# Patient Record
Sex: FEMALE | NOT HISPANIC OR LATINO
[De-identification: names, ages, dates, MRNs, and addresses within clinical notes are randomized per-mention and may not be internally consistent; named-entity substitution may affect disease eponyms.]

---

## 2018-09-17 PROBLEM — Z00.00 ENCOUNTER FOR PREVENTIVE HEALTH EXAMINATION: Status: ACTIVE | Noted: 2018-09-17

## 2018-09-25 ENCOUNTER — TRANSCRIPTION ENCOUNTER (OUTPATIENT)
Age: 38
End: 2018-09-25

## 2018-10-17 ENCOUNTER — APPOINTMENT (OUTPATIENT)
Dept: RHEUMATOLOGY | Facility: CLINIC | Age: 38
End: 2018-10-17
Payer: COMMERCIAL

## 2018-10-17 VITALS
OXYGEN SATURATION: 98 % | SYSTOLIC BLOOD PRESSURE: 119 MMHG | HEIGHT: 65 IN | WEIGHT: 125 LBS | DIASTOLIC BLOOD PRESSURE: 79 MMHG | HEART RATE: 78 BPM | BODY MASS INDEX: 20.83 KG/M2

## 2018-10-17 DIAGNOSIS — Z78.9 OTHER SPECIFIED HEALTH STATUS: ICD-10-CM

## 2018-10-17 DIAGNOSIS — Z82.61 FAMILY HISTORY OF ARTHRITIS: ICD-10-CM

## 2018-10-17 PROCEDURE — 99204 OFFICE O/P NEW MOD 45 MIN: CPT

## 2018-10-19 ENCOUNTER — LABORATORY RESULT (OUTPATIENT)
Age: 38
End: 2018-10-19

## 2018-10-19 LAB
25(OH)D3 SERPL-MCNC: 19.9 NG/ML
ALBUMIN SERPL ELPH-MCNC: 4.5 G/DL
ALP BLD-CCNC: 59 U/L
ALT SERPL-CCNC: 17 U/L
ANION GAP SERPL CALC-SCNC: 13 MMOL/L
APPEARANCE: CLEAR
AST SERPL-CCNC: 19 U/L
BASOPHILS # BLD AUTO: 0.01 K/UL
BASOPHILS NFR BLD AUTO: 0.2 %
BILIRUB SERPL-MCNC: 0.6 MG/DL
BILIRUBIN URINE: NEGATIVE
BLOOD URINE: NEGATIVE
BUN SERPL-MCNC: 15 MG/DL
CALCIUM SERPL-MCNC: 9.4 MG/DL
CHLORIDE SERPL-SCNC: 104 MMOL/L
CK SERPL-CCNC: 67 U/L
CO2 SERPL-SCNC: 23 MMOL/L
COLOR: YELLOW
CREAT SERPL-MCNC: 0.72 MG/DL
CREAT SPEC-SCNC: 79 MG/DL
CREAT/PROT UR: 0.1 RATIO
CRP SERPL-MCNC: <0.1 MG/DL
EOSINOPHIL # BLD AUTO: 0.11 K/UL
EOSINOPHIL NFR BLD AUTO: 2.1 %
ERYTHROCYTE [SEDIMENTATION RATE] IN BLOOD BY WESTERGREN METHOD: 10 MM/HR
GLUCOSE QUALITATIVE U: NEGATIVE MG/DL
GLUCOSE SERPL-MCNC: 85 MG/DL
HAV IGM SER QL: NONREACTIVE
HBV CORE IGM SER QL: NONREACTIVE
HBV SURFACE AG SER QL: NONREACTIVE
HCT VFR BLD CALC: 40.8 %
HCV AB SER QL: NONREACTIVE
HCV S/CO RATIO: 0.1 S/CO
HGB BLD-MCNC: 13.7 G/DL
IMM GRANULOCYTES NFR BLD AUTO: 0.2 %
KETONES URINE: ABNORMAL
LEUKOCYTE ESTERASE URINE: NEGATIVE
LYMPHOCYTES # BLD AUTO: 2.07 K/UL
LYMPHOCYTES NFR BLD AUTO: 40.4 %
MAN DIFF?: NORMAL
MCHC RBC-ENTMCNC: 31.2 PG
MCHC RBC-ENTMCNC: 33.6 GM/DL
MCV RBC AUTO: 92.9 FL
MONOCYTES # BLD AUTO: 0.31 K/UL
MONOCYTES NFR BLD AUTO: 6.1 %
NEUTROPHILS # BLD AUTO: 2.61 K/UL
NEUTROPHILS NFR BLD AUTO: 51 %
NITRITE URINE: NEGATIVE
PH URINE: 5.5
PLATELET # BLD AUTO: 233 K/UL
POTASSIUM SERPL-SCNC: 4 MMOL/L
PROT SERPL-MCNC: 7.3 G/DL
PROT UR-MCNC: 5 MG/DL
PROTEIN URINE: NEGATIVE MG/DL
RBC # BLD: 4.39 M/UL
RBC # FLD: 12.4 %
RHEUMATOID FACT SER QL: <10 IU/ML
SODIUM SERPL-SCNC: 140 MMOL/L
SPECIFIC GRAVITY URINE: 1.02
THYROGLOB AB SERPL-ACNC: <20 IU/ML
THYROPEROXIDASE AB SERPL IA-ACNC: 18.4 IU/ML
TSH SERPL-ACNC: 1.77 UIU/ML
UROBILINOGEN URINE: NEGATIVE MG/DL
WBC # FLD AUTO: 5.12 K/UL

## 2018-10-21 LAB
CCP AB SER IA-ACNC: <8 UNITS
CENTROMERE IGG SER-ACNC: <0.2 AL
DSDNA AB SER-ACNC: <12 IU/ML
ENA RNP AB SER IA-ACNC: <0.2 AL
ENA SCL70 IGG SER IA-ACNC: <0.2 AL
ENA SM AB SER IA-ACNC: <0.2 AL
ENA SS-A AB SER IA-ACNC: 4.6 AL
ENA SS-B AB SER IA-ACNC: <0.2 AL
RF+CCP IGG SER-IMP: NEGATIVE

## 2018-10-22 LAB
ANA SER IF-ACNC: NEGATIVE
G6PD SER-CCNC: 13.5 U/G HGB
MITOCHONDRIA AB SER IF-ACNC: ABNORMAL
SMOOTH MUSCLE AB SER QL IF: NORMAL

## 2018-10-23 LAB
B2 GLYCOPROT1 IGG SER-ACNC: <5 SGU
CARDIOLIPIN IGM SER-MCNC: 7.3 MPL
CARDIOLIPIN IGM SER-MCNC: <5 GPL

## 2018-10-24 LAB — B2 GLYCOPROT1 IGM SER-ACNC: 104.2 SMU

## 2018-10-26 ENCOUNTER — RX RENEWAL (OUTPATIENT)
Age: 38
End: 2018-10-26

## 2019-01-22 ENCOUNTER — LABORATORY RESULT (OUTPATIENT)
Age: 39
End: 2019-01-22

## 2019-01-28 LAB
B2 GLYCOPROT1 IGG SER-ACNC: <5 SGU
B2 GLYCOPROT1 IGM SER-ACNC: 55.4 SMU
CARDIOLIPIN IGM SER-MCNC: 5.8 GPL
CARDIOLIPIN IGM SER-MCNC: 6.1 MPL

## 2019-04-17 ENCOUNTER — APPOINTMENT (OUTPATIENT)
Dept: RHEUMATOLOGY | Facility: CLINIC | Age: 39
End: 2019-04-17
Payer: COMMERCIAL

## 2019-04-17 VITALS
HEART RATE: 83 BPM | OXYGEN SATURATION: 98 % | BODY MASS INDEX: 19.99 KG/M2 | HEIGHT: 65 IN | DIASTOLIC BLOOD PRESSURE: 75 MMHG | WEIGHT: 120 LBS | SYSTOLIC BLOOD PRESSURE: 116 MMHG

## 2019-04-17 PROCEDURE — 99214 OFFICE O/P EST MOD 30 MIN: CPT

## 2019-04-17 RX ORDER — OSELTAMIVIR PHOSPHATE 75 MG/1
75 CAPSULE ORAL
Qty: 10 | Refills: 0 | Status: DISCONTINUED | COMMUNITY
Start: 2018-09-23 | End: 2019-04-17

## 2019-04-17 NOTE — HISTORY OF PRESENT ILLNESS
[FreeTextEntry1] : Patient returns for a followup after 6 months. I had initially seen her in October as a new patient. She has a diagnosis of Sjogren syndrome. Her labs reveal a positive Sjögren's antibodies, which confirmed dry eyes and dry mouth. She was also noted to have positive beta-2 glycoprotein antibody on 2 occasions now. She was noted to have a positive mitochondrial antibody. She saw GI and was told that she would be watched clinically.\par \par She has never been pregnant, she denies miscarriages and or thromboembolism. There is a significant family history of autoimmunity. Her mother has rheumatoid arthritis, her aunt has lupus. Her aunt has had several miscarriages. She's not sure whether she wants to start a family yet.\par \par She admits to fatigue and joint pains, she rates her symptoms around a 5/10. She admits to dry eyes and dry mouth which does bother her.\par \par She denies alopecia Raynaud's phenomenon. She denies fevers or chills or night sweats. She is currently not using birth control.

## 2019-04-17 NOTE — ASSESSMENT
[FreeTextEntry1] : 38 year old  female with no significant past medical history has a previous diagnosis of Sjogren syndrome. The diagnosis was made several years ago by a rheumatologist based on her symptoms and abnormal labs.\par \par She has a significant family history of autoimmunity with several family members with lupus and rheumatoid arthritis. Her current review of systems is positive for alopecia, Raynaud's phenomenon, dry eyes, dysphagia, fatigue, muscle and joint pain.\par \par She has had an abnormal eye exam in the past. Today, and examination she has full range of motion of her joints without any evidence of inflammatory arthritis. She does not have any tender points on exam.\par \par Based on her history I agree with the diagnosis of Sjogren syndrome. \par \par Sjogren syndrome-\par -she is to start Plaquenil 200 mg b.i.d. given dryness in the eyes and mouth and joint pains.\par -Given the presence of the Sjögren's antibodies when she gets pregnant the fetus will have to be monitored for heart block\par -I made her aware of this\par \par Antiphospholipid antibody-\par -she has had positive beta-2 glycoprotein antibodies on 2 occasions now\par -There is a family history of miscarriages\par -She is not contemplating any future pregnancies.\par -If and when she does plan to get pregnant she will be placed on aspirin\par \par Polyarthralgia-\par -she has arthralgias and fatigue, trial of Plaquenil\par -She is aware to get her eyes examined\par \par Followup 3 months. She is aware to call if her symptoms worsen.

## 2019-04-17 NOTE — REVIEW OF SYSTEMS
[Feeling Tired] : feeling tired [Dry Eyes] : dryness of the eyes [Joint Pain] : joint pain [Arthralgias] : arthralgias [Fever] : no fever [Chills] : no chills [Feeling Poorly] : not feeling poorly [Eye Pain] : no eye pain [Loss Of Hearing] : no hearing loss [Chest Pain] : no chest pain [Palpitations] : no palpitations [Leg Claudication] : no intermittent leg claudication [Lower Ext Edema] : no lower extremity edema [Shortness Of Breath] : no shortness of breath [Cough] : no cough [SOB on Exertion] : no shortness of breath during exertion [Abdominal Pain] : no abdominal pain [Dysuria] : no dysuria [Joint Swelling] : no joint swelling [Joint Stiffness] : no joint stiffness [Skin Lesions] : no skin lesions [Itching] : no itching [Dizziness] : no dizziness [Fainting] : no fainting [Limb Weakness] : no limb weakness [Difficulty Walking] : no difficulty walking [Anxiety] : no anxiety [Depression] : no depression [Muscle Weakness] : no muscle weakness [Easy Bruising] : no tendency for easy bruising

## 2019-04-17 NOTE — PHYSICAL EXAM
[General Appearance - Well Developed] : well developed [General Appearance - Alert] : alert [General Appearance - Well Nourished] : well nourished [Neck Appearance] : the appearance of the neck was normal [Sclera] : the sclera and conjunctiva were normal [Oropharynx] : the oropharynx was normal [Heart Sounds] : normal S1 and S2 [Auscultation Breath Sounds / Voice Sounds] : lungs were clear to auscultation bilaterally [Respiration, Rhythm And Depth] : normal respiratory rhythm and effort [Edema] : there was no peripheral edema [Murmurs] : no murmurs [Full Pulse] : the pedal pulses are present [Cervical Lymph Nodes Enlarged Anterior Bilaterally] : anterior cervical [Supraclavicular Lymph Nodes Enlarged Bilaterally] : supraclavicular [Abdomen Tenderness] : non-tender [No Spinal Tenderness] : no spinal tenderness [Abnormal Walk] : normal gait [Nail Clubbing] : no clubbing  or cyanosis of the fingernails [Musculoskeletal - Swelling] : no joint swelling seen [Motor Tone] : muscle strength and tone were normal [] : no rash [Sensation] : the sensory exam was normal to light touch and pinprick [Deep Tendon Reflexes (DTR)] : deep tendon reflexes were 2+ and symmetric [Oriented To Time, Place, And Person] : oriented to person, place, and time [Impaired Insight] : insight and judgment were intact [Motor Exam] : the motor exam was normal [Affect] : the affect was normal [FreeTextEntry1] : FROM neck, shoulders, elbows, wrists, hands, hips, knees, ankles and feet, including the small joints of the hands and feet without any evidence of inflammatory arthritis no tender points noted

## 2019-04-29 ENCOUNTER — EMERGENCY (EMERGENCY)
Facility: HOSPITAL | Age: 39
LOS: 1 days | Discharge: HOME | End: 2019-04-29
Attending: EMERGENCY MEDICINE | Admitting: EMERGENCY MEDICINE
Payer: COMMERCIAL

## 2019-04-29 VITALS
DIASTOLIC BLOOD PRESSURE: 72 MMHG | HEART RATE: 91 BPM | WEIGHT: 119.93 LBS | TEMPERATURE: 101 F | HEIGHT: 65 IN | SYSTOLIC BLOOD PRESSURE: 122 MMHG | RESPIRATION RATE: 16 BRPM | OXYGEN SATURATION: 99 %

## 2019-04-29 VITALS
TEMPERATURE: 98 F | OXYGEN SATURATION: 99 % | HEART RATE: 70 BPM | DIASTOLIC BLOOD PRESSURE: 63 MMHG | SYSTOLIC BLOOD PRESSURE: 102 MMHG | RESPIRATION RATE: 16 BRPM

## 2019-04-29 LAB
ALBUMIN SERPL ELPH-MCNC: 3.7 G/DL — SIGNIFICANT CHANGE UP (ref 3.3–5)
ALP SERPL-CCNC: 59 U/L — SIGNIFICANT CHANGE UP (ref 30–120)
ALT FLD-CCNC: 32 U/L DA — SIGNIFICANT CHANGE UP (ref 10–60)
ANION GAP SERPL CALC-SCNC: 11 MMOL/L — SIGNIFICANT CHANGE UP (ref 5–17)
AST SERPL-CCNC: 21 U/L — SIGNIFICANT CHANGE UP (ref 10–40)
BILIRUB SERPL-MCNC: 0.5 MG/DL — SIGNIFICANT CHANGE UP (ref 0.2–1.2)
BUN SERPL-MCNC: 9 MG/DL — SIGNIFICANT CHANGE UP (ref 7–23)
CALCIUM SERPL-MCNC: 8.5 MG/DL — SIGNIFICANT CHANGE UP (ref 8.4–10.5)
CHLORIDE SERPL-SCNC: 102 MMOL/L — SIGNIFICANT CHANGE UP (ref 96–108)
CO2 SERPL-SCNC: 25 MMOL/L — SIGNIFICANT CHANGE UP (ref 22–31)
CREAT SERPL-MCNC: 0.76 MG/DL — SIGNIFICANT CHANGE UP (ref 0.5–1.3)
FLU A RESULT: SIGNIFICANT CHANGE UP
FLU A RESULT: SIGNIFICANT CHANGE UP
FLUAV AG NPH QL: SIGNIFICANT CHANGE UP
FLUBV AG NPH QL: SIGNIFICANT CHANGE UP
GLUCOSE SERPL-MCNC: 120 MG/DL — HIGH (ref 70–99)
HCT VFR BLD CALC: 38.5 % — SIGNIFICANT CHANGE UP (ref 34.5–45)
HGB BLD-MCNC: 13.4 G/DL — SIGNIFICANT CHANGE UP (ref 11.5–15.5)
MCHC RBC-ENTMCNC: 31.5 PG — SIGNIFICANT CHANGE UP (ref 27–34)
MCHC RBC-ENTMCNC: 34.8 GM/DL — SIGNIFICANT CHANGE UP (ref 32–36)
MCV RBC AUTO: 90.4 FL — SIGNIFICANT CHANGE UP (ref 80–100)
NRBC # BLD: 0 /100 WBCS — SIGNIFICANT CHANGE UP (ref 0–0)
PLATELET # BLD AUTO: 172 K/UL — SIGNIFICANT CHANGE UP (ref 150–400)
POTASSIUM SERPL-MCNC: 3.4 MMOL/L — LOW (ref 3.5–5.3)
POTASSIUM SERPL-SCNC: 3.4 MMOL/L — LOW (ref 3.5–5.3)
PROT SERPL-MCNC: 7 G/DL — SIGNIFICANT CHANGE UP (ref 6–8.3)
RBC # BLD: 4.26 M/UL — SIGNIFICANT CHANGE UP (ref 3.8–5.2)
RBC # FLD: 11.8 % — SIGNIFICANT CHANGE UP (ref 10.3–14.5)
RSV RESULT: SIGNIFICANT CHANGE UP
RSV RNA RESP QL NAA+PROBE: SIGNIFICANT CHANGE UP
SODIUM SERPL-SCNC: 138 MMOL/L — SIGNIFICANT CHANGE UP (ref 135–145)
WBC # BLD: 8.33 K/UL — SIGNIFICANT CHANGE UP (ref 3.8–10.5)
WBC # FLD AUTO: 8.33 K/UL — SIGNIFICANT CHANGE UP (ref 3.8–10.5)

## 2019-04-29 PROCEDURE — 85027 COMPLETE CBC AUTOMATED: CPT

## 2019-04-29 PROCEDURE — 96360 HYDRATION IV INFUSION INIT: CPT

## 2019-04-29 PROCEDURE — 99284 EMERGENCY DEPT VISIT MOD MDM: CPT

## 2019-04-29 PROCEDURE — 99284 EMERGENCY DEPT VISIT MOD MDM: CPT | Mod: 25

## 2019-04-29 PROCEDURE — 36415 COLL VENOUS BLD VENIPUNCTURE: CPT

## 2019-04-29 PROCEDURE — 80053 COMPREHEN METABOLIC PANEL: CPT

## 2019-04-29 PROCEDURE — 87631 RESP VIRUS 3-5 TARGETS: CPT

## 2019-04-29 RX ORDER — SODIUM CHLORIDE 9 MG/ML
2000 INJECTION INTRAMUSCULAR; INTRAVENOUS; SUBCUTANEOUS ONCE
Qty: 0 | Refills: 0 | Status: DISCONTINUED | OUTPATIENT
Start: 2019-04-29 | End: 2019-04-29

## 2019-04-29 RX ORDER — DIPHENHYDRAMINE HCL 50 MG
25 CAPSULE ORAL EVERY 4 HOURS
Qty: 0 | Refills: 0 | Status: DISCONTINUED | OUTPATIENT
Start: 2019-04-29 | End: 2019-05-03

## 2019-04-29 RX ORDER — IBUPROFEN 200 MG
600 TABLET ORAL ONCE
Qty: 0 | Refills: 0 | Status: COMPLETED | OUTPATIENT
Start: 2019-04-29 | End: 2019-04-29

## 2019-04-29 RX ORDER — HYDROXYCHLOROQUINE SULFATE 200 MG
0 TABLET ORAL
Qty: 0 | Refills: 0 | COMMUNITY

## 2019-04-29 RX ORDER — POLYMYXIN B SULF/TRIMETHOPRIM 10000-1/ML
1 DROPS OPHTHALMIC (EYE) ONCE
Qty: 0 | Refills: 0 | Status: COMPLETED | OUTPATIENT
Start: 2019-04-29 | End: 2019-04-29

## 2019-04-29 RX ORDER — SODIUM CHLORIDE 9 MG/ML
1000 INJECTION INTRAMUSCULAR; INTRAVENOUS; SUBCUTANEOUS ONCE
Qty: 0 | Refills: 0 | Status: COMPLETED | OUTPATIENT
Start: 2019-04-29 | End: 2019-04-29

## 2019-04-29 RX ADMIN — Medication 600 MILLIGRAM(S): at 12:46

## 2019-04-29 RX ADMIN — Medication 600 MILLIGRAM(S): at 13:47

## 2019-04-29 RX ADMIN — Medication 1 DROP(S): at 13:47

## 2019-04-29 RX ADMIN — SODIUM CHLORIDE 1000 MILLILITER(S): 9 INJECTION INTRAMUSCULAR; INTRAVENOUS; SUBCUTANEOUS at 14:00

## 2019-04-29 RX ADMIN — SODIUM CHLORIDE 1000 MILLILITER(S): 9 INJECTION INTRAMUSCULAR; INTRAVENOUS; SUBCUTANEOUS at 13:00

## 2019-04-29 RX ADMIN — Medication 25 MILLIGRAM(S): at 14:45

## 2019-04-29 NOTE — ED PROVIDER NOTE - CLINICAL SUMMARY MEDICAL DECISION MAKING FREE TEXT BOX
Pt seen and examined.  IV fluids given.  Motrin given for fever.    Labs ordered.  Flu ordered.  REsults discussed with pt and .  Continue Motrin for fever control.  saty hydrated.

## 2019-04-29 NOTE — ED ADULT NURSE NOTE - NSIMPLEMENTINTERV_GEN_ALL_ED
Implemented All Universal Safety Interventions:  [place] to call system. Call bell, personal items and telephone within reach. Instruct patient to call for assistance. Room bathroom lighting operational. Non-slip footwear when patient is off stretcher. Physically safe environment: no spills, clutter or unnecessary equipment. Stretcher in lowest position, wheels locked, appropriate side rails in place.

## 2019-04-29 NOTE — ED PROVIDER NOTE - PROGRESS NOTE DETAILS
Brain JARAMILLO for ED attending, Dr. Curtis : 37 y/o female with fever, body aches, dry cough, and near syncope today. Denies HA, CP, SOB, vomiting.   PE: temp 101.5, NAD, HEENT nl, lungs clear, abd soft, nontender. Red hive like rash on legs, pruritic in nature.  Impression: viral syndrome, r/o influenza, r/o allergic rx. Plan - labs, IV fluids, flu swab.

## 2019-04-29 NOTE — ED ADULT NURSE NOTE - OBJECTIVE STATEMENT
Pt came in for fever and generalized body aches started last night. Pt woke up this morning with redness and swelling of her both eyes. Pt also stated that she passed out ( witnessed by her SO) this morning while in the bathroom. No head injury noted. No vomiting No diarrhea. No chest pain

## 2019-04-29 NOTE — ED PROVIDER NOTE - OBJECTIVE STATEMENT
Pt is 37y/o female who presents to ED c/o fever since yesterday, highest 103, chills, rash which started this am.

## 2019-05-22 ENCOUNTER — LABORATORY RESULT (OUTPATIENT)
Age: 39
End: 2019-05-22

## 2019-05-28 LAB
ALBUMIN SERPL ELPH-MCNC: 4.2 G/DL
ALP BLD-CCNC: 59 U/L
ALT SERPL-CCNC: 23 U/L
ANA SER IF-ACNC: NEGATIVE
ANION GAP SERPL CALC-SCNC: 11 MMOL/L
AST SERPL-CCNC: 24 U/L
B2 GLYCOPROT1 IGG SER-ACNC: <5 SGU
B2 GLYCOPROT1 IGM SER-ACNC: >150 SMU
BASOPHILS # BLD AUTO: 0.02 K/UL
BASOPHILS NFR BLD AUTO: 0.5 %
BILIRUB SERPL-MCNC: 0.5 MG/DL
BUN SERPL-MCNC: 14 MG/DL
CALCIUM SERPL-MCNC: 9.3 MG/DL
CARDIOLIPIN IGM SER-MCNC: 5.3 GPL
CARDIOLIPIN IGM SER-MCNC: <5 MPL
CCP AB SER IA-ACNC: <8 UNITS
CHLORIDE SERPL-SCNC: 107 MMOL/L
CO2 SERPL-SCNC: 23 MMOL/L
CREAT SERPL-MCNC: 0.67 MG/DL
CREAT SPEC-SCNC: 83 MG/DL
CREAT/PROT UR: 0.1 RATIO
CRP SERPL-MCNC: <0.1 MG/DL
DSDNA AB SER-ACNC: <12 IU/ML
ENA RNP AB SER IA-ACNC: <0.2 AL
ENA SM AB SER IA-ACNC: <0.2 AL
ENA SS-A AB SER IA-ACNC: 4 AL
ENA SS-B AB SER IA-ACNC: <0.2 AL
EOSINOPHIL # BLD AUTO: 0.2 K/UL
EOSINOPHIL NFR BLD AUTO: 4.6 %
ERYTHROCYTE [SEDIMENTATION RATE] IN BLOOD BY WESTERGREN METHOD: 15 MM/HR
GLUCOSE SERPL-MCNC: 85 MG/DL
HCT VFR BLD CALC: 39.4 %
HGB BLD-MCNC: 13.2 G/DL
IMM GRANULOCYTES NFR BLD AUTO: 0.2 %
LYMPHOCYTES # BLD AUTO: 2.02 K/UL
LYMPHOCYTES NFR BLD AUTO: 46.7 %
MAN DIFF?: NORMAL
MCHC RBC-ENTMCNC: 30.6 PG
MCHC RBC-ENTMCNC: 33.5 GM/DL
MCV RBC AUTO: 91.2 FL
MITOCHONDRIA AB SER IF-ACNC: NORMAL
MONOCYTES # BLD AUTO: 0.26 K/UL
MONOCYTES NFR BLD AUTO: 6 %
NEUTROPHILS # BLD AUTO: 1.82 K/UL
NEUTROPHILS NFR BLD AUTO: 42 %
PLATELET # BLD AUTO: 253 K/UL
POTASSIUM SERPL-SCNC: 4.6 MMOL/L
PROT SERPL-MCNC: 7.2 G/DL
PROT UR-MCNC: 7 MG/DL
RBC # BLD: 4.32 M/UL
RBC # FLD: 12 %
RF+CCP IGG SER-IMP: NEGATIVE
RHEUMATOID FACT SER QL: <10 IU/ML
SMOOTH MUSCLE AB SER QL IF: NORMAL
SODIUM SERPL-SCNC: 141 MMOL/L
WBC # FLD AUTO: 4.33 K/UL

## 2019-08-19 ENCOUNTER — APPOINTMENT (OUTPATIENT)
Dept: RHEUMATOLOGY | Facility: CLINIC | Age: 39
End: 2019-08-19
Payer: COMMERCIAL

## 2019-08-19 VITALS
DIASTOLIC BLOOD PRESSURE: 67 MMHG | HEART RATE: 70 BPM | SYSTOLIC BLOOD PRESSURE: 106 MMHG | BODY MASS INDEX: 19.99 KG/M2 | OXYGEN SATURATION: 98 % | HEIGHT: 65 IN | WEIGHT: 120 LBS

## 2019-08-19 PROBLEM — M35.00 SJOGREN SYNDROME, UNSPECIFIED: Chronic | Status: ACTIVE | Noted: 2019-04-29

## 2019-08-19 PROCEDURE — 99214 OFFICE O/P EST MOD 30 MIN: CPT

## 2019-08-19 NOTE — ASSESSMENT
[FreeTextEntry1] : 39 year old  female with no significant past medical history has a previous diagnosis of Sjogren syndrome. The diagnosis was made several years ago by a rheumatologist based on her symptoms and abnormal labs.\par \par She has a significant family history of autoimmunity with several family members with lupus and rheumatoid arthritis. Her current review of systems is positive for alopecia, Raynaud's phenomenon, dry eyes, dysphagia, fatigue, muscle and joint pain.\par \par She has had an abnormal eye exam in the past. Today, and examination she has full range of motion of her joints without any evidence of inflammatory arthritis. She does not have any tender points on exam.\par \par Based on her history I agree with the diagnosis of Sjogren syndrome. \par \par Sjogren syndrome-\par -she is on Plaquenil 200 mg b.i.d. given dryness in the eyes and mouth and joint pains.\par -Given the presence of the Sjögren's antibodies when she gets pregnant the fetus will have to be monitored for heart block\par -I made her aware of this\par - await full response to PLQ\par \par Antiphospholipid antibody-\par -she has had positive beta-2 glycoprotein antibodies on 2 occasions now\par -There is a family history of miscarriages\par -She is not contemplating any future pregnancies.\par -If and when she does plan to get pregnant she will be placed on aspirin\par \par Polyarthralgia-\par -her back bothers her , h/o trauma in the past\par - to see physiatry for further evaluation\par - she may have an underlying herniated disc\par \par Followup 3 months. She is aware to call if her symptoms worsen.

## 2019-08-19 NOTE — REVIEW OF SYSTEMS
[Fever] : no fever [Chills] : no chills [Feeling Poorly] : not feeling poorly [Feeling Tired] : feeling tired [Eye Pain] : no eye pain [Dry Eyes] : dryness of the eyes [Loss Of Hearing] : no hearing loss [Chest Pain] : no chest pain [Palpitations] : no palpitations [Leg Claudication] : no intermittent leg claudication [Shortness Of Breath] : no shortness of breath [Lower Ext Edema] : no lower extremity edema [Cough] : no cough [SOB on Exertion] : no shortness of breath during exertion [Dysuria] : no dysuria [Abdominal Pain] : no abdominal pain [Arthralgias] : arthralgias [Joint Pain] : joint pain [Joint Swelling] : no joint swelling [Joint Stiffness] : no joint stiffness [Skin Lesions] : no skin lesions [Itching] : no itching [Dizziness] : no dizziness [Fainting] : no fainting [Limb Weakness] : no limb weakness [Difficulty Walking] : no difficulty walking [Anxiety] : no anxiety [Depression] : no depression [Muscle Weakness] : no muscle weakness [Easy Bruising] : no tendency for easy bruising

## 2019-08-19 NOTE — PHYSICAL EXAM
[General Appearance - Alert] : alert [General Appearance - Well Nourished] : well nourished [General Appearance - Well Developed] : well developed [Sclera] : the sclera and conjunctiva were normal [Oropharynx] : the oropharynx was normal [Neck Appearance] : the appearance of the neck was normal [Respiration, Rhythm And Depth] : normal respiratory rhythm and effort [Auscultation Breath Sounds / Voice Sounds] : lungs were clear to auscultation bilaterally [Heart Sounds] : normal S1 and S2 [Murmurs] : no murmurs [Full Pulse] : the pedal pulses are present [Edema] : there was no peripheral edema [Abdomen Tenderness] : non-tender [Cervical Lymph Nodes Enlarged Anterior Bilaterally] : anterior cervical [Supraclavicular Lymph Nodes Enlarged Bilaterally] : supraclavicular [No Spinal Tenderness] : no spinal tenderness [Abnormal Walk] : normal gait [Nail Clubbing] : no clubbing  or cyanosis of the fingernails [Musculoskeletal - Swelling] : no joint swelling seen [Motor Tone] : muscle strength and tone were normal [FreeTextEntry1] : FROM neck, shoulders, elbows, wrists, hands, hips, knees, ankles and feet, including the small joints of the hands and feet without any evidence of inflammatory arthritis no tender points noted [] : no rash [Deep Tendon Reflexes (DTR)] : deep tendon reflexes were 2+ and symmetric [Sensation] : the sensory exam was normal to light touch and pinprick [Motor Exam] : the motor exam was normal [Oriented To Time, Place, And Person] : oriented to person, place, and time [Impaired Insight] : insight and judgment were intact [Affect] : the affect was normal

## 2019-10-15 LAB
25(OH)D3 SERPL-MCNC: 65.4 NG/ML
ALBUMIN SERPL ELPH-MCNC: 4.5 G/DL
ALP BLD-CCNC: 47 U/L
ALT SERPL-CCNC: 16 U/L
ANA SER IF-ACNC: NEGATIVE
ANION GAP SERPL CALC-SCNC: 15 MMOL/L
AST SERPL-CCNC: 18 U/L
BASOPHILS # BLD AUTO: 0.02 K/UL
BASOPHILS NFR BLD AUTO: 0.5 %
BILIRUB SERPL-MCNC: 0.5 MG/DL
BUN SERPL-MCNC: 14 MG/DL
C3 SERPL-MCNC: 72 MG/DL
C4 SERPL-MCNC: 14 MG/DL
CALCIUM SERPL-MCNC: 9.1 MG/DL
CHLORIDE SERPL-SCNC: 105 MMOL/L
CO2 SERPL-SCNC: 21 MMOL/L
CREAT SERPL-MCNC: 0.71 MG/DL
CREAT SPEC-SCNC: 86 MG/DL
CREAT/PROT UR: 0.1 RATIO
CRP SERPL-MCNC: <0.1 MG/DL
ENA SS-A AB SER IA-ACNC: 3.3 AL
ENA SS-B AB SER IA-ACNC: <0.2 AL
EOSINOPHIL # BLD AUTO: 0.1 K/UL
EOSINOPHIL NFR BLD AUTO: 2.6 %
ERYTHROCYTE [SEDIMENTATION RATE] IN BLOOD BY WESTERGREN METHOD: 7 MM/HR
GLUCOSE SERPL-MCNC: 89 MG/DL
HCT VFR BLD CALC: 41 %
HGB BLD-MCNC: 13.5 G/DL
IMM GRANULOCYTES NFR BLD AUTO: 0.3 %
LYMPHOCYTES # BLD AUTO: 1.83 K/UL
LYMPHOCYTES NFR BLD AUTO: 47.8 %
MAN DIFF?: NORMAL
MCHC RBC-ENTMCNC: 31 PG
MCHC RBC-ENTMCNC: 32.9 GM/DL
MCV RBC AUTO: 94 FL
MONOCYTES # BLD AUTO: 0.32 K/UL
MONOCYTES NFR BLD AUTO: 8.4 %
NEUTROPHILS # BLD AUTO: 1.55 K/UL
NEUTROPHILS NFR BLD AUTO: 40.4 %
PLATELET # BLD AUTO: 219 K/UL
POTASSIUM SERPL-SCNC: 4.4 MMOL/L
PROT SERPL-MCNC: 6.9 G/DL
PROT UR-MCNC: 7 MG/DL
RBC # BLD: 4.36 M/UL
RBC # FLD: 11.9 %
SODIUM SERPL-SCNC: 141 MMOL/L
WBC # FLD AUTO: 3.83 K/UL

## 2019-11-25 ENCOUNTER — APPOINTMENT (OUTPATIENT)
Dept: RHEUMATOLOGY | Facility: CLINIC | Age: 39
End: 2019-11-25
Payer: COMMERCIAL

## 2019-11-25 VITALS
SYSTOLIC BLOOD PRESSURE: 108 MMHG | OXYGEN SATURATION: 99 % | TEMPERATURE: 98.6 F | DIASTOLIC BLOOD PRESSURE: 70 MMHG | HEART RATE: 71 BPM | WEIGHT: 120 LBS | BODY MASS INDEX: 19.97 KG/M2

## 2019-11-25 PROCEDURE — 99214 OFFICE O/P EST MOD 30 MIN: CPT | Mod: 25

## 2019-11-25 PROCEDURE — 36415 COLL VENOUS BLD VENIPUNCTURE: CPT

## 2019-11-25 NOTE — REVIEW OF SYSTEMS
[Fever] : no fever [Chills] : no chills [Feeling Poorly] : not feeling poorly [Feeling Tired] : feeling tired [Eye Pain] : no eye pain [Dry Eyes] : dryness of the eyes [Loss Of Hearing] : no hearing loss [Chest Pain] : no chest pain [Palpitations] : no palpitations [Leg Claudication] : no intermittent leg claudication [Lower Ext Edema] : no lower extremity edema [Shortness Of Breath] : no shortness of breath [Cough] : no cough [SOB on Exertion] : no shortness of breath during exertion [Abdominal Pain] : no abdominal pain [Dysuria] : no dysuria [Arthralgias] : arthralgias [Joint Pain] : joint pain [Joint Swelling] : no joint swelling [Joint Stiffness] : no joint stiffness [Skin Lesions] : no skin lesions [Itching] : no itching [Dizziness] : no dizziness [Fainting] : no fainting [Limb Weakness] : no limb weakness [Difficulty Walking] : no difficulty walking [Anxiety] : no anxiety [Depression] : no depression [Muscle Weakness] : no muscle weakness [Easy Bruising] : no tendency for easy bruising

## 2019-11-25 NOTE — PHYSICAL EXAM
[General Appearance - Alert] : alert [General Appearance - Well Nourished] : well nourished [General Appearance - Well Developed] : well developed [Sclera] : the sclera and conjunctiva were normal [Oropharynx] : the oropharynx was normal [Neck Appearance] : the appearance of the neck was normal [Respiration, Rhythm And Depth] : normal respiratory rhythm and effort [Auscultation Breath Sounds / Voice Sounds] : lungs were clear to auscultation bilaterally [Heart Sounds] : normal S1 and S2 [Murmurs] : no murmurs [Full Pulse] : the pedal pulses are present [Edema] : there was no peripheral edema [Abdomen Tenderness] : non-tender [Cervical Lymph Nodes Enlarged Anterior Bilaterally] : anterior cervical [Supraclavicular Lymph Nodes Enlarged Bilaterally] : supraclavicular [No Spinal Tenderness] : no spinal tenderness [Abnormal Walk] : normal gait [Nail Clubbing] : no clubbing  or cyanosis of the fingernails [Motor Tone] : muscle strength and tone were normal [Musculoskeletal - Swelling] : no joint swelling seen [FreeTextEntry1] : FROM neck, shoulders, elbows, wrists, hands, hips, knees, ankles and feet, including the small joints of the hands and feet without any evidence of inflammatory arthritis no tender points noted [] : no rash [Deep Tendon Reflexes (DTR)] : deep tendon reflexes were 2+ and symmetric [Sensation] : the sensory exam was normal to light touch and pinprick [Motor Exam] : the motor exam was normal [Oriented To Time, Place, And Person] : oriented to person, place, and time [Affect] : the affect was normal [Impaired Insight] : insight and judgment were intact

## 2019-11-25 NOTE — HISTORY OF PRESENT ILLNESS
[FreeTextEntry1] : Rina is here for a followup after August. She is now on Plaquenil 200 mg b.i.d. She has been doing well. She denies any rashes or fevers.\par She has less fatigue and overall thinks the PLQ is definitely helping. \par Her eye exam was normal.\par \par She admits to hair thinning but denies patchy alopecia. She admits to dry eyes and dry mouth, she rates her symptoms as a 3/10. She admits to fatigue around a 5/10. She has intermittent aches and pains around 3/10.\par \par She denies oral ulcers.  She denies chest pain or shortness of breath. She has aches and pains.\par She tries to exercise regularly. She denies any other changes in her health\par She has a good appetite and denies weight loss. She denies fevers or chills or night sweats.

## 2019-11-25 NOTE — ASSESSMENT
[FreeTextEntry1] : 39 year old  female with no significant past medical history has a previous diagnosis of Sjogren syndrome. The diagnosis was made several years ago by a rheumatologist based on her symptoms and abnormal labs.\par \par She has a significant family history of autoimmunity with several family members with lupus and rheumatoid arthritis. Her current review of systems is positive for alopecia, Raynaud's phenomenon, dry eyes, dysphagia, fatigue, muscle and joint pain.\par \par She has had an abnormal eye exam in the past. Today, and examination she has full range of motion of her joints without any evidence of inflammatory arthritis. She does not have any tender points on exam.\par \par Based on her history I agree with the diagnosis of Sjogren syndrome. \par \par Sjogren syndrome-\par -she is on Plaquenil 200 mg b.i.d. given dryness in the eyes and mouth and joint pains.\par - she is definitely better with PLQ\par -Given the presence of the Sjögren's antibodies when she gets pregnant the fetus will have to be monitored for heart block\par -I made her aware of this\par - stressed importance of eye exam\par - on last set of labs C3 was low, this is the first time I ordered it will repeat along with w/u for paraproteinemia\par \par Antiphospholipid antibody-\par -she has had positive beta-2 glycoprotein antibodies on 2 occasions now\par -There is a family history of miscarriages\par -She is not contemplating any future pregnancies.\par -If and when she does plan to get pregnant she will be placed on aspirin\par \par Polyarthralgia-\par -overall improved\par \par Followup 4 months. She is aware to call if her symptoms worsen.

## 2019-11-26 LAB
ALBUMIN SERPL ELPH-MCNC: 4.4 G/DL
ALP BLD-CCNC: 51 U/L
ALT SERPL-CCNC: 22 U/L
ANION GAP SERPL CALC-SCNC: 13 MMOL/L
AST SERPL-CCNC: 20 U/L
BASOPHILS # BLD AUTO: 0.02 K/UL
BASOPHILS NFR BLD AUTO: 0.4 %
BILIRUB SERPL-MCNC: 0.4 MG/DL
BUN SERPL-MCNC: 8 MG/DL
C3 SERPL-MCNC: 78 MG/DL
C4 SERPL-MCNC: 14 MG/DL
CALCIUM SERPL-MCNC: 9.8 MG/DL
CHLORIDE SERPL-SCNC: 106 MMOL/L
CO2 SERPL-SCNC: 22 MMOL/L
CREAT SERPL-MCNC: 0.72 MG/DL
CRP SERPL-MCNC: <0.1 MG/DL
EOSINOPHIL # BLD AUTO: 0.1 K/UL
EOSINOPHIL NFR BLD AUTO: 2.2 %
ERYTHROCYTE [SEDIMENTATION RATE] IN BLOOD BY WESTERGREN METHOD: 4 MM/HR
GLUCOSE SERPL-MCNC: 90 MG/DL
HCT VFR BLD CALC: 40.3 %
HGB BLD-MCNC: 13 G/DL
IMM GRANULOCYTES NFR BLD AUTO: 0 %
LYMPHOCYTES # BLD AUTO: 2.46 K/UL
LYMPHOCYTES NFR BLD AUTO: 53.1 %
MAN DIFF?: NORMAL
MCHC RBC-ENTMCNC: 30.7 PG
MCHC RBC-ENTMCNC: 32.3 GM/DL
MCV RBC AUTO: 95.3 FL
MONOCYTES # BLD AUTO: 0.35 K/UL
MONOCYTES NFR BLD AUTO: 7.6 %
NEUTROPHILS # BLD AUTO: 1.7 K/UL
NEUTROPHILS NFR BLD AUTO: 36.7 %
PLATELET # BLD AUTO: 223 K/UL
POTASSIUM SERPL-SCNC: 4.2 MMOL/L
PROT SERPL-MCNC: 7 G/DL
RBC # BLD: 4.23 M/UL
RBC # FLD: 12.4 %
RHEUMATOID FACT SER QL: <10 IU/ML
SODIUM SERPL-SCNC: 141 MMOL/L
WBC # FLD AUTO: 4.63 K/UL

## 2019-12-01 LAB
ALBUMIN MFR SERPL ELPH: 61.5 %
ALBUMIN SERPL-MCNC: 4.3 G/DL
ALBUMIN/GLOB SERPL: 1.6 RATIO
ALPHA1 GLOB MFR SERPL ELPH: 2.6 %
ALPHA1 GLOB SERPL ELPH-MCNC: 0.2 G/DL
ALPHA2 GLOB MFR SERPL ELPH: 7.6 %
ALPHA2 GLOB SERPL ELPH-MCNC: 0.5 G/DL
ANA SER IF-ACNC: NEGATIVE
B-GLOBULIN MFR SERPL ELPH: 9.6 %
B-GLOBULIN SERPL ELPH-MCNC: 0.7 G/DL
CCP AB SER IA-ACNC: <8 UNITS
DEPRECATED KAPPA LC FREE/LAMBDA SER: 0.97 RATIO
ENA SS-A AB SER IA-ACNC: 3.9 AL
ENA SS-B AB SER IA-ACNC: <0.2 AL
GAMMA GLOB FLD ELPH-MCNC: 1.3 G/DL
GAMMA GLOB MFR SERPL ELPH: 18.7 %
IGA SER QL IEP: 228 MG/DL
IGG SER QL IEP: 1291 MG/DL
IGM SER QL IEP: 182 MG/DL
INTERPRETATION SERPL IEP-IMP: NORMAL
KAPPA LC CSF-MCNC: 1.5 MG/DL
KAPPA LC SERPL-MCNC: 1.45 MG/DL
M PROTEIN MFR SERPL ELPH: 2.2 %
M PROTEIN SPEC IFE-MCNC: NORMAL
MONOCLON BAND OBS SERPL: 0.2 G/DL
PROT SERPL-MCNC: 7 G/DL
PROT SERPL-MCNC: 7 G/DL
RF+CCP IGG SER-IMP: NEGATIVE

## 2020-01-27 ENCOUNTER — OUTPATIENT (OUTPATIENT)
Dept: OUTPATIENT SERVICES | Facility: HOSPITAL | Age: 40
LOS: 1 days | Discharge: HOME | End: 2020-01-27

## 2020-01-31 ENCOUNTER — APPOINTMENT (OUTPATIENT)
Dept: HEMATOLOGY ONCOLOGY | Facility: CLINIC | Age: 40
End: 2020-01-31
Payer: COMMERCIAL

## 2020-01-31 VITALS
WEIGHT: 123.38 LBS | DIASTOLIC BLOOD PRESSURE: 74 MMHG | RESPIRATION RATE: 16 BRPM | HEART RATE: 70 BPM | TEMPERATURE: 98.5 F | BODY MASS INDEX: 20.55 KG/M2 | HEIGHT: 65 IN | SYSTOLIC BLOOD PRESSURE: 124 MMHG

## 2020-01-31 PROCEDURE — 99203 OFFICE O/P NEW LOW 30 MIN: CPT

## 2020-01-31 NOTE — REVIEW OF SYSTEMS
[Patient Intake Form Reviewed] : Patient intake form was reviewed [Fatigue] : fatigue [Joint Pain] : joint pain [Joint Stiffness] : joint stiffness [Negative] : Heme/Lymph [FreeTextEntry4] : dry mouth

## 2020-01-31 NOTE — REASON FOR VISIT
[Initial Consultation] : an initial consultation for [FreeTextEntry2] : IgG lambda MGUS , antiphospholipid antibodies

## 2020-01-31 NOTE — CONSULT LETTER
[Dear  ___] : Dear  [unfilled], [( Thank you for referring [unfilled] for consultation for _____ )] : Thank you for referring [unfilled] for consultation for [unfilled] [Please see my note below.] : Please see my note below. [Consult Closing:] : Thank you very much for allowing me to participate in the care of this patient.  If you have any questions, please do not hesitate to contact me. [Sincerely,] : Sincerely, [FreeTextEntry2] : Dr Laura Hood  [FreeTextEntry3] : Yulia Samuels

## 2020-01-31 NOTE — HISTORY OF PRESENT ILLNESS
[de-identified] : 40 y/o woman with Sjogren syndrome diagnosed few years ago, currently on Plaquenil- stable. Blood work done by her rheumatologist Dr Hood in November 2019 showed low level monoclonal IgG lambda. No anemia, renal disease, hypercalcemia or bone problems.\par Also she has  hx of elevated antiphospholipid antibodies -beta2 glycoprotein  IgM detected on several occasions with negative rest of APS panel and negative Lupus anticoagulant. \par No hx of thromboembolism. Not on OCP.  No  miscarriages. \par \par Fells well.

## 2020-01-31 NOTE — ASSESSMENT
[FreeTextEntry1] : 38 y/o woman with very low level of monoclonal IgG lambda incidentally detected on blood work. No clinical symptoms to suggest underlying plasma cell disorder.\par Clinical presentation c/w low risk MGUS-  low M spike, normal immunoglobulin levels, normal kappa to lambda ratio and no anemia, renal disease.\par Discussed natural history and small risk of progression to plasma cell disorder .Need for periodic monitoring - every 6 months for now with blood work.\par \par Discussed positivity for APS antibodies- meets laboratory criteria for APS ( significant titer documented on several occasions) but no clinical symptoms. Will need VTE prophylaxis in high risk setting ( pregnancy,  surgeries etc) .\par \par \par Monitor.\par Blood work in 6 months ( June )- CBC. BMP and immunoelectrophoresis.\par \par RV in June after labs. \par \par d/w patient and she is comfortable with the plan

## 2020-01-31 NOTE — PHYSICAL EXAM
[Normal] : clear to auscultation bilaterally, no dullness, no wheezing [de-identified] : no splenomegaly

## 2020-02-03 DIAGNOSIS — D47.2 MONOCLONAL GAMMOPATHY: ICD-10-CM

## 2020-02-03 DIAGNOSIS — D68.61 ANTIPHOSPHOLIPID SYNDROME: ICD-10-CM

## 2020-02-26 ENCOUNTER — RX RENEWAL (OUTPATIENT)
Age: 40
End: 2020-02-26

## 2020-06-01 ENCOUNTER — RX RENEWAL (OUTPATIENT)
Age: 40
End: 2020-06-01

## 2020-06-03 ENCOUNTER — APPOINTMENT (OUTPATIENT)
Dept: RHEUMATOLOGY | Facility: CLINIC | Age: 40
End: 2020-06-03
Payer: COMMERCIAL

## 2020-06-03 ENCOUNTER — LABORATORY RESULT (OUTPATIENT)
Age: 40
End: 2020-06-03

## 2020-06-03 VITALS
DIASTOLIC BLOOD PRESSURE: 70 MMHG | SYSTOLIC BLOOD PRESSURE: 110 MMHG | HEART RATE: 67 BPM | WEIGHT: 125 LBS | TEMPERATURE: 98.8 F | HEIGHT: 65 IN | BODY MASS INDEX: 20.83 KG/M2 | OXYGEN SATURATION: 98 %

## 2020-06-03 PROCEDURE — 99214 OFFICE O/P EST MOD 30 MIN: CPT | Mod: 25

## 2020-06-03 PROCEDURE — 36415 COLL VENOUS BLD VENIPUNCTURE: CPT

## 2020-06-03 NOTE — HISTORY OF PRESENT ILLNESS
[FreeTextEntry1] : Rnia is here for a followup after November. She is on Plaquenil 200 mg b.i.d. She has been doing well. She denies any rashes or fevers.\par She has less fatigue and overall thinks the PLQ is definitely helping. \par Her eye exam was normal.\par \par She admits to hair thinning but denies patchy alopecia. She admits to dry eyes and dry mouth, she rates her symptoms as a 3/10. She admits to fatigue around a 5/10. She has intermittent aches and pains around 3/10.\par She saw hematology and was noted to have antiphospholipid antibodies, Is not contemplating any pregnancies in the near future\par She also wants to know if she can see GI, she has intermittent abdominal pain with weird symptoms. She has had a colonoscopy endoscopy as a cook. She was told she may have IBS, she is gluten free at this time.\par \par She denies oral ulcers.  She denies chest pain or shortness of breath. She has aches and pains.\par She tries to exercise regularly. She denies any other changes in her health\par She has a good appetite and denies weight loss. She denies fevers or chills or night sweats.

## 2020-06-03 NOTE — REVIEW OF SYSTEMS
[Chills] : no chills [Fever] : no fever [Feeling Poorly] : not feeling poorly [Eye Pain] : no eye pain [Feeling Tired] : feeling tired [Loss Of Hearing] : no hearing loss [Dry Eyes] : dryness of the eyes [Chest Pain] : no chest pain [Palpitations] : no palpitations [Leg Claudication] : no intermittent leg claudication [Lower Ext Edema] : no lower extremity edema [Shortness Of Breath] : no shortness of breath [SOB on Exertion] : no shortness of breath during exertion [Cough] : no cough [Abdominal Pain] : no abdominal pain [Dysuria] : no dysuria [Arthralgias] : arthralgias [Joint Pain] : joint pain [Joint Swelling] : no joint swelling [Joint Stiffness] : no joint stiffness [Itching] : no itching [Dizziness] : no dizziness [Skin Lesions] : no skin lesions [Fainting] : no fainting [Limb Weakness] : no limb weakness [Difficulty Walking] : no difficulty walking [Anxiety] : no anxiety [Easy Bruising] : no tendency for easy bruising [Depression] : no depression [Muscle Weakness] : no muscle weakness

## 2020-06-03 NOTE — ASSESSMENT
[FreeTextEntry1] : 40 year old  female with no significant past medical history has a previous diagnosis of Sjogren syndrome. The diagnosis was made several years ago by a rheumatologist based on her symptoms and abnormal labs.\par \par She has a significant family history of autoimmunity with several family members with lupus and rheumatoid arthritis. Her current review of systems is positive for alopecia, Raynaud's phenomenon, dry eyes, dysphagia, fatigue, muscle and joint pain.\par \par She has had an abnormal eye exam in the past. Today, and examination she has full range of motion of her joints without any evidence of inflammatory arthritis. She does not have any tender points on exam.\par \par Based on her history I agree with the diagnosis of Sjogren syndrome. \par \par Sjogren syndrome-\par -she is on Plaquenil 200 mg b.i.d. given dryness in the eyes and mouth and joint pains.\par - she is definitely better with PLQ\par -Given the presence of the Sjögren's antibodies when she gets pregnant the fetus will have to be monitored for heart block\par -I made her aware of this\par - stressed importance of eye exam\par \par Antiphospholipid antibody-\par -she has had positive beta-2 glycoprotein antibodies on 2 occasions now\par -There is a family history of miscarriages\par -She is not contemplating any future pregnancies.\par -If and when she does plan to get pregnant she will be placed on aspirin\par \par Polyarthralgia-\par -overall improved\par \par \par Celiac disease\par -presumed diagnoses\par -Will see GI\par \par Followup 7 months. She is aware to call if her symptoms worsen.

## 2020-06-03 NOTE — PHYSICAL EXAM
[General Appearance - Alert] : alert [General Appearance - Well Nourished] : well nourished [General Appearance - Well Developed] : well developed [Sclera] : the sclera and conjunctiva were normal [Oropharynx] : the oropharynx was normal [Respiration, Rhythm And Depth] : normal respiratory rhythm and effort [Neck Appearance] : the appearance of the neck was normal [Murmurs] : no murmurs [Auscultation Breath Sounds / Voice Sounds] : lungs were clear to auscultation bilaterally [Heart Sounds] : normal S1 and S2 [Abdomen Tenderness] : non-tender [Full Pulse] : the pedal pulses are present [Edema] : there was no peripheral edema [Supraclavicular Lymph Nodes Enlarged Bilaterally] : supraclavicular [Cervical Lymph Nodes Enlarged Anterior Bilaterally] : anterior cervical [Abnormal Walk] : normal gait [No Spinal Tenderness] : no spinal tenderness [Musculoskeletal - Swelling] : no joint swelling seen [Nail Clubbing] : no clubbing  or cyanosis of the fingernails [Motor Tone] : muscle strength and tone were normal [FreeTextEntry1] : FROM neck, shoulders, elbows, wrists, hands, hips, knees, ankles and feet, including the small joints of the hands and feet without any evidence of inflammatory arthritis no tender points noted [Sensation] : the sensory exam was normal to light touch and pinprick [Deep Tendon Reflexes (DTR)] : deep tendon reflexes were 2+ and symmetric [] : no rash [Motor Exam] : the motor exam was normal [Oriented To Time, Place, And Person] : oriented to person, place, and time [Impaired Insight] : insight and judgment were intact [Affect] : the affect was normal

## 2020-06-04 LAB
25(OH)D3 SERPL-MCNC: 65.1 NG/ML
ALBUMIN SERPL ELPH-MCNC: 4.5 G/DL
ALP BLD-CCNC: 53 U/L
ALT SERPL-CCNC: 18 U/L
ANA SER IF-ACNC: NEGATIVE
ANION GAP SERPL CALC-SCNC: 11 MMOL/L
APPEARANCE: CLEAR
AST SERPL-CCNC: 20 U/L
BASOPHILS # BLD AUTO: 0.02 K/UL
BASOPHILS NFR BLD AUTO: 0.4 %
BILIRUB SERPL-MCNC: 0.3 MG/DL
BILIRUBIN URINE: NEGATIVE
BLOOD URINE: NEGATIVE
BUN SERPL-MCNC: 10 MG/DL
C3 SERPL-MCNC: 76 MG/DL
C4 SERPL-MCNC: 15 MG/DL
CALCIUM SERPL-MCNC: 9.2 MG/DL
CHLORIDE SERPL-SCNC: 107 MMOL/L
CO2 SERPL-SCNC: 23 MMOL/L
COLOR: COLORLESS
CREAT SERPL-MCNC: 0.77 MG/DL
CREAT SPEC-SCNC: 25 MG/DL
CREAT/PROT UR: 0.3 RATIO
CRP SERPL-MCNC: <0.1 MG/DL
ENA RNP AB SER IA-ACNC: <0.2 AL
ENA SM AB SER IA-ACNC: <0.2 AL
ENA SS-A AB SER IA-ACNC: 2.7 AL
ENA SS-B AB SER IA-ACNC: <0.2 AL
EOSINOPHIL # BLD AUTO: 0.08 K/UL
EOSINOPHIL NFR BLD AUTO: 1.8 %
ERYTHROCYTE [SEDIMENTATION RATE] IN BLOOD BY WESTERGREN METHOD: 3 MM/HR
GLUCOSE QUALITATIVE U: NEGATIVE
GLUCOSE SERPL-MCNC: 92 MG/DL
HCT VFR BLD CALC: 40.2 %
HGB BLD-MCNC: 13.1 G/DL
IMM GRANULOCYTES NFR BLD AUTO: 0.2 %
KETONES URINE: NEGATIVE
LEUKOCYTE ESTERASE URINE: NEGATIVE
LUPUS ANTICOAGULANT CASCADE REFLEX: NORMAL
LYMPHOCYTES # BLD AUTO: 2.15 K/UL
LYMPHOCYTES NFR BLD AUTO: 48.1 %
MAN DIFF?: NORMAL
MCHC RBC-ENTMCNC: 30.8 PG
MCHC RBC-ENTMCNC: 32.6 GM/DL
MCV RBC AUTO: 94.4 FL
MONOCYTES # BLD AUTO: 0.33 K/UL
MONOCYTES NFR BLD AUTO: 7.4 %
NEUTROPHILS # BLD AUTO: 1.88 K/UL
NEUTROPHILS NFR BLD AUTO: 42.1 %
NITRITE URINE: NEGATIVE
PH URINE: 5.5
PLATELET # BLD AUTO: 222 K/UL
POTASSIUM SERPL-SCNC: 4.3 MMOL/L
PROT SERPL-MCNC: 6.9 G/DL
PROT UR-MCNC: 8 MG/DL
PROTEIN URINE: NEGATIVE
RBC # BLD: 4.26 M/UL
RBC # FLD: 11.9 %
RHEUMATOID FACT SER QL: <10 IU/ML
SODIUM SERPL-SCNC: 141 MMOL/L
SPECIFIC GRAVITY URINE: 1
UROBILINOGEN URINE: NORMAL
WBC # FLD AUTO: 4.47 K/UL

## 2020-06-05 LAB
CARDIOLIPIN IGM SER-MCNC: 7.4 GPL
CARDIOLIPIN IGM SER-MCNC: 9.8 MPL
DSDNA AB SER-ACNC: <12 IU/ML

## 2020-06-08 LAB
B2 GLYCOPROT1 IGG SER-ACNC: <5 SGU
B2 GLYCOPROT1 IGM SER-ACNC: 113.5 SMU

## 2020-06-12 LAB
CCP AB SER IA-ACNC: <8 UNITS
RF+CCP IGG SER-IMP: NEGATIVE

## 2020-06-23 ENCOUNTER — OUTPATIENT (OUTPATIENT)
Dept: OUTPATIENT SERVICES | Facility: HOSPITAL | Age: 40
LOS: 1 days | Discharge: HOME | End: 2020-06-23
Payer: COMMERCIAL

## 2020-06-23 DIAGNOSIS — D68.61 ANTIPHOSPHOLIPID SYNDROME: ICD-10-CM

## 2020-06-23 DIAGNOSIS — D47.2 MONOCLONAL GAMMOPATHY: ICD-10-CM

## 2020-06-26 LAB
APPEARANCE: CLEAR
BILIRUBIN URINE: NEGATIVE
BLOOD URINE: NEGATIVE
COLOR: NORMAL
CREAT SPEC-SCNC: 18 MG/DL
CREAT/PROT UR: 0.3 RATIO
GLUCOSE QUALITATIVE U: NEGATIVE
KETONES URINE: NEGATIVE
LEUKOCYTE ESTERASE URINE: NEGATIVE
NITRITE URINE: NEGATIVE
PH URINE: 6
PROT UR-MCNC: 5 MG/DL
PROTEIN URINE: NEGATIVE
SPECIFIC GRAVITY URINE: 1
UROBILINOGEN URINE: NORMAL

## 2020-07-07 ENCOUNTER — LABORATORY RESULT (OUTPATIENT)
Age: 40
End: 2020-07-07

## 2020-07-09 LAB
BASOPHILS # BLD AUTO: 0.03 K/UL
BASOPHILS NFR BLD AUTO: 0.6 %
EOSINOPHIL # BLD AUTO: 0.1 K/UL
EOSINOPHIL NFR BLD AUTO: 2.1 %
HCT VFR BLD CALC: 40.6 %
HGB BLD-MCNC: 13.3 G/DL
IMM GRANULOCYTES NFR BLD AUTO: 0.2 %
LYMPHOCYTES # BLD AUTO: 2.15 K/UL
LYMPHOCYTES NFR BLD AUTO: 45.6 %
MAN DIFF?: NORMAL
MCHC RBC-ENTMCNC: 30.2 PG
MCHC RBC-ENTMCNC: 32.8 GM/DL
MCV RBC AUTO: 92.3 FL
MONOCYTES # BLD AUTO: 0.34 K/UL
MONOCYTES NFR BLD AUTO: 7.2 %
NEUTROPHILS # BLD AUTO: 2.08 K/UL
NEUTROPHILS NFR BLD AUTO: 44.3 %
PLATELET # BLD AUTO: 235 K/UL
RBC # BLD: 4.4 M/UL
RBC # FLD: 11.9 %
WBC # FLD AUTO: 4.71 K/UL

## 2020-07-10 LAB
ALBUMIN SERPL ELPH-MCNC: 4.3 G/DL
ALP BLD-CCNC: 50 U/L
ALT SERPL-CCNC: 17 U/L
ANA SER IF-ACNC: NEGATIVE
ANION GAP SERPL CALC-SCNC: 13 MMOL/L
APPEARANCE: CLEAR
AST SERPL-CCNC: 16 U/L
BASOPHILS # BLD AUTO: 0.02 K/UL
BASOPHILS NFR BLD AUTO: 0.4 %
BILIRUB SERPL-MCNC: 0.6 MG/DL
BILIRUBIN URINE: NEGATIVE
BLOOD URINE: NEGATIVE
BUN SERPL-MCNC: 15 MG/DL
C3 SERPL-MCNC: 81 MG/DL
C4 SERPL-MCNC: 13 MG/DL
CALCIUM SERPL-MCNC: 8.8 MG/DL
CCP AB SER IA-ACNC: <8 UNITS
CHLORIDE SERPL-SCNC: 108 MMOL/L
CO2 SERPL-SCNC: 21 MMOL/L
COLOR: NORMAL
CREAT SERPL-MCNC: 0.7 MG/DL
CREAT SPEC-SCNC: 73 MG/DL
CREAT/PROT UR: 0.1 RATIO
CRP SERPL-MCNC: <0.1 MG/DL
DSDNA AB SER-ACNC: <12 IU/ML
ENA RNP AB SER IA-ACNC: <0.2 AL
ENA SM AB SER IA-ACNC: <0.2 AL
ENA SS-A AB SER IA-ACNC: 3 AL
ENA SS-B AB SER IA-ACNC: <0.2 AL
EOSINOPHIL # BLD AUTO: 0.11 K/UL
EOSINOPHIL NFR BLD AUTO: 2.2 %
ERYTHROCYTE [SEDIMENTATION RATE] IN BLOOD BY WESTERGREN METHOD: 9 MM/HR
GLUCOSE QUALITATIVE U: NEGATIVE
GLUCOSE SERPL-MCNC: 94 MG/DL
HCT VFR BLD CALC: 39.7 %
HGB BLD-MCNC: 13.5 G/DL
IMM GRANULOCYTES NFR BLD AUTO: 0 %
KETONES URINE: NEGATIVE
LEUKOCYTE ESTERASE URINE: NEGATIVE
LUPUS ANTICOAGULANT CASCADE REFLEX: NORMAL
LYMPHOCYTES # BLD AUTO: 2.22 K/UL
LYMPHOCYTES NFR BLD AUTO: 45.3 %
MAN DIFF?: NORMAL
MCHC RBC-ENTMCNC: 31 PG
MCHC RBC-ENTMCNC: 34 GM/DL
MCV RBC AUTO: 91.1 FL
MONOCYTES # BLD AUTO: 0.34 K/UL
MONOCYTES NFR BLD AUTO: 6.9 %
NEUTROPHILS # BLD AUTO: 2.21 K/UL
NEUTROPHILS NFR BLD AUTO: 45.2 %
NITRITE URINE: NEGATIVE
PH URINE: 6.5
PLATELET # BLD AUTO: 242 K/UL
POTASSIUM SERPL-SCNC: 4.5 MMOL/L
PROT SERPL-MCNC: 6.5 G/DL
PROT UR-MCNC: 6 MG/DL
PROTEIN URINE: NEGATIVE
RBC # BLD: 4.36 M/UL
RBC # FLD: 11.8 %
RF+CCP IGG SER-IMP: NEGATIVE
RHEUMATOID FACT SER QL: <10 IU/ML
SODIUM SERPL-SCNC: 142 MMOL/L
SPECIFIC GRAVITY URINE: 1.01
UROBILINOGEN URINE: NORMAL
WBC # FLD AUTO: 4.9 K/UL

## 2020-07-15 ENCOUNTER — RESULT REVIEW (OUTPATIENT)
Age: 40
End: 2020-07-15

## 2020-07-15 ENCOUNTER — APPOINTMENT (OUTPATIENT)
Dept: HEMATOLOGY ONCOLOGY | Facility: CLINIC | Age: 40
End: 2020-07-15
Payer: COMMERCIAL

## 2020-07-15 VITALS
HEART RATE: 80 BPM | WEIGHT: 122 LBS | BODY MASS INDEX: 20.33 KG/M2 | RESPIRATION RATE: 16 BRPM | TEMPERATURE: 98.5 F | HEIGHT: 65 IN | DIASTOLIC BLOOD PRESSURE: 74 MMHG | SYSTOLIC BLOOD PRESSURE: 111 MMHG

## 2020-07-15 LAB
ALBUMIN MFR SERPL ELPH: 61.7 %
ALBUMIN SERPL-MCNC: 4.1 G/DL
ALBUMIN/GLOB SERPL: 1.6 RATIO
ALPHA1 GLOB MFR SERPL ELPH: 2.8 %
ALPHA1 GLOB SERPL ELPH-MCNC: 0.2 G/DL
ALPHA2 GLOB MFR SERPL ELPH: 7.5 %
ALPHA2 GLOB SERPL ELPH-MCNC: 0.5 G/DL
B-GLOBULIN MFR SERPL ELPH: 9.8 %
B-GLOBULIN SERPL ELPH-MCNC: 0.7 G/DL
DEPRECATED KAPPA LC FREE/LAMBDA SER: 1.15 RATIO
GAMMA GLOB FLD ELPH-MCNC: 1.2 G/DL
GAMMA GLOB MFR SERPL ELPH: 18.2 %
IGA SER QL IEP: 207 MG/DL
IGG SER QL IEP: 1066 MG/DL
IGM SER QL IEP: 178 MG/DL
INTERPRETATION SERPL IEP-IMP: NORMAL
KAPPA LC CSF-MCNC: 1.36 MG/DL
KAPPA LC SERPL-MCNC: 1.57 MG/DL
M PROTEIN MFR SERPL ELPH: 2.3 %
M PROTEIN SPEC IFE-MCNC: NORMAL
MONOCLON BAND OBS SERPL: 0.2 G/DL
PROT SERPL-MCNC: 6.7 G/DL
PROT SERPL-MCNC: 6.7 G/DL

## 2020-07-15 PROCEDURE — 84166 PROTEIN E-PHORESIS/URINE/CSF: CPT | Mod: 26

## 2020-07-15 PROCEDURE — 99214 OFFICE O/P EST MOD 30 MIN: CPT

## 2020-07-15 RX ORDER — CHROMIUM 200 MCG
25 MCG TABLET ORAL DAILY
Refills: 0 | Status: ACTIVE | COMMUNITY
Start: 2020-07-15

## 2020-07-15 NOTE — REVIEW OF SYSTEMS
[Patient Intake Form Reviewed] : Patient intake form was reviewed [Joint Pain] : joint pain [Fatigue] : fatigue [Joint Stiffness] : joint stiffness [Negative] : Heme/Lymph [FreeTextEntry4] : dry mouth

## 2020-07-15 NOTE — ASSESSMENT
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 No clinical symptoms to suggest underlying plasma cell disorder.\par Clinical presentation c/w low risk MGUS-  low M spike, normal immunoglobulin levels, normal kappa to lambda ratio and no anemia, renal disease.\par We  discussed natural history and small risk of progression to plasma cell disorder .Need for periodic monitoring - every 6 months for now with blood work.\par \par \par her hematologic parameters are stable. No evidence of progression.\par \par Continue to monitor- every 6 months ( she will have blood work in [facility] in Jan)\par \par Persistent ( on tested multiple times)  elevated beta2 GP IGM No thromboembolic events.\par Recommend: low dose ASA 81 mg if tolerated ( optional) .\par Will need VTE prophylaxis in high risk setting ( pregnancy,  surgeries etc) .\par \par RV in  January  after labs. \par \par

## 2020-07-15 NOTE — HISTORY OF PRESENT ILLNESS
[de-identified] : No new issues. Occasional small bruises on legs.  [de-identified] : 40 y/o woman with Sjogren syndrome diagnosed few years ago, currently on Plaquenil- stable. Blood work done by her rheumatologist Dr Hood in November 2019 showed low level monoclonal IgG lambda. No anemia, renal disease, hypercalcemia or bone problems.\par Also she has  hx of elevated antiphospholipid antibodies -beta2 glycoprotein  IgM detected on several occasions with negative rest of APS panel and negative Lupus anticoagulant. \par No hx of thromboembolism. Not on OCP.  No  miscarriages. \par \par Fells well.

## 2020-07-15 NOTE — REASON FOR VISIT
[Follow-Up Visit] : a follow-up visit for [FreeTextEntry2] : IgG lambda MGUS , antiphospholipid antibodies

## 2020-07-16 LAB
CREATININE, URINE RESULT: 12 MG/DL — SIGNIFICANT CHANGE UP
PROT ?TM UR-MCNC: <4 MG/DL — SIGNIFICANT CHANGE UP (ref 0–12)
PROT ?TM UR-MCNC: <4 MG/DL — SIGNIFICANT CHANGE UP (ref 0–12)

## 2020-07-21 LAB
% GAMMA, URINE: 24.8 % — SIGNIFICANT CHANGE UP
ALBUMIN 24H MFR UR ELPH: 18.6 % — SIGNIFICANT CHANGE UP
ALPHA1 GLOB 24H MFR UR ELPH: 26.6 % — SIGNIFICANT CHANGE UP
ALPHA2 GLOB 24H MFR UR ELPH: 18.4 % — SIGNIFICANT CHANGE UP
B-GLOBULIN 24H MFR UR ELPH: 11.6 % — SIGNIFICANT CHANGE UP
INTERPRETATION 24H UR IFE-IMP: SIGNIFICANT CHANGE UP
INTERPRETATION 24H UR IFE-IMP: SIGNIFICANT CHANGE UP
M PROTEIN 24H UR ELPH-MRATE: SIGNIFICANT CHANGE UP
PROT ?TM UR-MCNC: <4 MG/DL — SIGNIFICANT CHANGE UP (ref 0–12)
PROT PATTERN 24H UR ELPH-IMP: SIGNIFICANT CHANGE UP
TOTAL VOLUME - 24 HOUR: SIGNIFICANT CHANGE UP ML
URINE CREATININE CALCULATION: SIGNIFICANT CHANGE UP G/24 H (ref 0.8–1.8)

## 2020-08-21 LAB — SARS-COV-2 N GENE NPH QL NAA+PROBE: NOT DETECTED

## 2020-09-02 ENCOUNTER — TRANSCRIPTION ENCOUNTER (OUTPATIENT)
Age: 40
End: 2020-09-02

## 2020-09-02 ENCOUNTER — APPOINTMENT (OUTPATIENT)
Dept: RHEUMATOLOGY | Facility: CLINIC | Age: 40
End: 2020-09-02
Payer: COMMERCIAL

## 2020-09-02 VITALS
DIASTOLIC BLOOD PRESSURE: 68 MMHG | SYSTOLIC BLOOD PRESSURE: 118 MMHG | BODY MASS INDEX: 20.8 KG/M2 | HEART RATE: 69 BPM | WEIGHT: 125 LBS | OXYGEN SATURATION: 98 % | TEMPERATURE: 97.9 F

## 2020-09-02 PROCEDURE — 99214 OFFICE O/P EST MOD 30 MIN: CPT

## 2020-09-03 NOTE — ASSESSMENT
[FreeTextEntry1] : 40 year old  female with no significant past medical history has a previous diagnosis of Sjogren syndrome. The diagnosis was made several years ago by a rheumatologist based on her symptoms and abnormal labs.\par \par She has a significant family history of autoimmunity with several family members with lupus and rheumatoid arthritis. Her current review of systems is positive for alopecia, Raynaud's phenomenon, dry eyes, dysphagia, fatigue, muscle and joint pain.\par \par She has had an abnormal eye exam in the past. Today, and examination she has full range of motion of her joints without any evidence of inflammatory arthritis. She does not have any tender points on exam.\par \par Based on her history I agree with the diagnosis of Sjogren syndrome. \par \par Sjogren syndrome-\par -she is on Plaquenil 200 mg b.i.d. given dryness in the eyes and mouth and joint pains.\par - she is definitely better with PLQ\par -Given the presence of the Sjögren's antibodies when she gets pregnant the fetus will have to be monitored for heart block\par -I made her aware of this\par -He had a recent flare up, it resolved I do feel that her symptoms may be secondary to stress. We'll continue to monitor. If she continues to have frequent flares we'll consider adding additional medication.\par - stressed importance of eye exam\par \par Antiphospholipid antibody-\par -she has had positive beta-2 glycoprotein antibodies on 2 occasions now\par -There is a family history of miscarriages\par -She is not contemplating any future pregnancies.\par -She has been started on aspirin by hematology\par \par MGUS-\par Cleared by hematology\par \par Followup 4 months. She is aware to call if her symptoms worsen.

## 2020-09-03 NOTE — PHYSICAL EXAM
[General Appearance - Alert] : alert [General Appearance - Well Developed] : well developed [Sclera] : the sclera and conjunctiva were normal [General Appearance - Well Nourished] : well nourished [Oropharynx] : the oropharynx was normal [Neck Appearance] : the appearance of the neck was normal [Respiration, Rhythm And Depth] : normal respiratory rhythm and effort [Heart Sounds] : normal S1 and S2 [Auscultation Breath Sounds / Voice Sounds] : lungs were clear to auscultation bilaterally [Murmurs] : no murmurs [Edema] : there was no peripheral edema [Full Pulse] : the pedal pulses are present [Cervical Lymph Nodes Enlarged Anterior Bilaterally] : anterior cervical [Abdomen Tenderness] : non-tender [Supraclavicular Lymph Nodes Enlarged Bilaterally] : supraclavicular [Abnormal Walk] : normal gait [Nail Clubbing] : no clubbing  or cyanosis of the fingernails [No Spinal Tenderness] : no spinal tenderness [Motor Tone] : muscle strength and tone were normal [FreeTextEntry1] : FROM neck, shoulders, elbows, wrists, hands, hips, knees, ankles and feet, including the small joints of the hands and feet without any evidence of inflammatory arthritis no tender points noted [] : no rash [Musculoskeletal - Swelling] : no joint swelling seen [Motor Exam] : the motor exam was normal [Sensation] : the sensory exam was normal to light touch and pinprick [Deep Tendon Reflexes (DTR)] : deep tendon reflexes were 2+ and symmetric [Oriented To Time, Place, And Person] : oriented to person, place, and time [Affect] : the affect was normal [Impaired Insight] : insight and judgment were intact

## 2020-09-03 NOTE — REVIEW OF SYSTEMS
[Fever] : no fever [Chills] : no chills [Feeling Poorly] : not feeling poorly [Feeling Tired] : feeling tired [Eye Pain] : no eye pain [Loss Of Hearing] : no hearing loss [Dry Eyes] : dryness of the eyes [Leg Claudication] : no intermittent leg claudication [Palpitations] : no palpitations [Chest Pain] : no chest pain [Lower Ext Edema] : no lower extremity edema [Shortness Of Breath] : no shortness of breath [SOB on Exertion] : no shortness of breath during exertion [Cough] : no cough [Abdominal Pain] : no abdominal pain [Dysuria] : no dysuria [Arthralgias] : arthralgias [Joint Pain] : joint pain [Joint Swelling] : no joint swelling [Itching] : no itching [Skin Lesions] : no skin lesions [Joint Stiffness] : no joint stiffness [Dizziness] : no dizziness [Limb Weakness] : no limb weakness [Fainting] : no fainting [Anxiety] : no anxiety [Difficulty Walking] : no difficulty walking [Muscle Weakness] : no muscle weakness [Easy Bruising] : no tendency for easy bruising [Depression] : no depression

## 2020-09-03 NOTE — HISTORY OF PRESENT ILLNESS
[FreeTextEntry1] : Rina is here for a followup . She is on Plaquenil 200 mg b.i.d. She has been doing well. She denies any rashes or fevers.\par She had a recent flare and was worried that she had covid. She admits to dryness in her mouth and a dry cough intermittently\par She saw hematology and was told to use asa 81 mg qd\par \par She admits to hair thinning but denies patchy alopecia. She admits to dry eyes and dry mouth, she rates her symptoms as a 3/10. She admits to fatigue around a 5/10. She has intermittent aches and pains around 3/10.\par She saw hematology and was noted to have antiphospholipid antibodies, Is not contemplating any pregnancies in the near future\par \par She denies oral ulcers.  She denies chest pain or shortness of breath. She has aches and pains.\par She tries to exercise regularly. She denies any other changes in her health\par She has a good appetite and denies weight loss. She denies fevers or chills or night sweats.

## 2021-01-01 NOTE — HISTORY OF PRESENT ILLNESS
[FreeTextEntry1] : Rina is here for a followup after April. She is now on Plaquenil 200 mg b.i.d. She had called me in May, at that time she had a viral illness and was covered in a rash. We resumed the Plaquenil. She has been doing well. She denies any rashes or fevers.\par \par Her eye exam was normal.\par \par She admits to hair thinning but denies patchy alopecia. She admits to dry eyes and dry mouth, she rates her symptoms as a 3/10. She admits to fatigue around a 5/10. She has intermittent aches and pains around 3/10.\par \par She denies oral ulcers. She admits to remain on Tenormin. She denies chest pain or shortness of breath. She has aches and pains.\par \par She injured her back a couple of years ago in her lower back bothers her. At that time she had had x-rays and was told that she has arthritis. She states that coughing bothers her lower back as well.\par \par She has a good appetite and denies weight loss. She denies fevers or chills or night sweats. LDR 2/labor/delivery

## 2021-01-05 LAB
ALBUMIN SERPL ELPH-MCNC: 4.6 G/DL
ALP BLD-CCNC: 51 U/L
ALT SERPL-CCNC: 15 U/L
ANION GAP SERPL CALC-SCNC: 12 MMOL/L
APPEARANCE: CLEAR
AST SERPL-CCNC: 17 U/L
BASOPHILS # BLD AUTO: 0.02 K/UL
BASOPHILS NFR BLD AUTO: 0.4 %
BILIRUB SERPL-MCNC: 0.6 MG/DL
BILIRUBIN URINE: NEGATIVE
BLOOD URINE: NEGATIVE
BUN SERPL-MCNC: 13 MG/DL
C3 SERPL-MCNC: 90 MG/DL
C4 SERPL-MCNC: 15 MG/DL
CALCIUM SERPL-MCNC: 8.9 MG/DL
CHLORIDE SERPL-SCNC: 104 MMOL/L
CO2 SERPL-SCNC: 21 MMOL/L
COLOR: COLORLESS
CREAT SERPL-MCNC: 0.83 MG/DL
CRP SERPL-MCNC: <0.1 MG/DL
EOSINOPHIL # BLD AUTO: 0.08 K/UL
EOSINOPHIL NFR BLD AUTO: 1.7 %
ERYTHROCYTE [SEDIMENTATION RATE] IN BLOOD BY WESTERGREN METHOD: 7 MM/HR
GLUCOSE QUALITATIVE U: NEGATIVE
GLUCOSE SERPL-MCNC: 79 MG/DL
HCT VFR BLD CALC: 40.9 %
HGB BLD-MCNC: 14 G/DL
IMM GRANULOCYTES NFR BLD AUTO: 0.4 %
KETONES URINE: NEGATIVE
LEUKOCYTE ESTERASE URINE: NEGATIVE
LYMPHOCYTES # BLD AUTO: 2.19 K/UL
LYMPHOCYTES NFR BLD AUTO: 46.8 %
MAN DIFF?: NORMAL
MCHC RBC-ENTMCNC: 31.5 PG
MCHC RBC-ENTMCNC: 34.2 GM/DL
MCV RBC AUTO: 91.9 FL
MONOCYTES # BLD AUTO: 0.31 K/UL
MONOCYTES NFR BLD AUTO: 6.6 %
NEUTROPHILS # BLD AUTO: 2.06 K/UL
NEUTROPHILS NFR BLD AUTO: 44.1 %
NITRITE URINE: NEGATIVE
PH URINE: 6
PLATELET # BLD AUTO: 225 K/UL
POTASSIUM SERPL-SCNC: 3.8 MMOL/L
PROT SERPL-MCNC: 6.9 G/DL
PROTEIN URINE: NEGATIVE
RBC # BLD: 4.45 M/UL
RBC # FLD: 11.9 %
RHEUMATOID FACT SER QL: <10 IU/ML
SODIUM SERPL-SCNC: 138 MMOL/L
SPECIFIC GRAVITY URINE: 1.01
UROBILINOGEN URINE: NORMAL
WBC # FLD AUTO: 4.68 K/UL

## 2021-01-06 LAB
CARDIOLIPIN IGM SER-MCNC: 5.6 MPL
CREAT SPEC-SCNC: 31 MG/DL
CREAT/PROT UR: NORMAL RATIO
ENA SS-A AB SER IA-ACNC: 3.3 AL
ENA SS-B AB SER IA-ACNC: <0.2 AL
PROT UR-MCNC: <4 MG/DL

## 2021-01-11 ENCOUNTER — APPOINTMENT (OUTPATIENT)
Dept: RHEUMATOLOGY | Facility: CLINIC | Age: 41
End: 2021-01-11
Payer: COMMERCIAL

## 2021-01-11 VITALS
TEMPERATURE: 98.6 F | DIASTOLIC BLOOD PRESSURE: 70 MMHG | SYSTOLIC BLOOD PRESSURE: 100 MMHG | WEIGHT: 129 LBS | HEART RATE: 90 BPM | OXYGEN SATURATION: 99 % | HEIGHT: 65 IN | BODY MASS INDEX: 21.49 KG/M2

## 2021-01-11 PROCEDURE — 99072 ADDL SUPL MATRL&STAF TM PHE: CPT

## 2021-01-11 PROCEDURE — 99214 OFFICE O/P EST MOD 30 MIN: CPT

## 2021-01-11 RX ORDER — CEVIMELINE HYDROCHLORIDE 30 MG/1
30 CAPSULE ORAL TWICE DAILY
Qty: 180 | Refills: 1 | Status: DISCONTINUED | COMMUNITY
Start: 2020-09-02 | End: 2021-01-11

## 2021-01-11 NOTE — PHYSICAL EXAM
[General Appearance - Alert] : alert [General Appearance - Well Nourished] : well nourished [General Appearance - Well Developed] : well developed [Sclera] : the sclera and conjunctiva were normal [Oropharynx] : the oropharynx was normal [Neck Appearance] : the appearance of the neck was normal [Respiration, Rhythm And Depth] : normal respiratory rhythm and effort [Auscultation Breath Sounds / Voice Sounds] : lungs were clear to auscultation bilaterally [Heart Sounds] : normal S1 and S2 [Murmurs] : no murmurs [Full Pulse] : the pedal pulses are present [Edema] : there was no peripheral edema [Abdomen Tenderness] : non-tender [Cervical Lymph Nodes Enlarged Anterior Bilaterally] : anterior cervical [Supraclavicular Lymph Nodes Enlarged Bilaterally] : supraclavicular [No Spinal Tenderness] : no spinal tenderness [Abnormal Walk] : normal gait [Nail Clubbing] : no clubbing  or cyanosis of the fingernails [Musculoskeletal - Swelling] : no joint swelling seen [Motor Tone] : muscle strength and tone were normal [] : no rash [Deep Tendon Reflexes (DTR)] : deep tendon reflexes were 2+ and symmetric [Sensation] : the sensory exam was normal to light touch and pinprick [Motor Exam] : the motor exam was normal [Oriented To Time, Place, And Person] : oriented to person, place, and time [Impaired Insight] : insight and judgment were intact [Affect] : the affect was normal [FreeTextEntry1] : FROM neck, shoulders, elbows, wrists, hands, hips, knees, ankles and feet, including the small joints of the hands and feet without any evidence of inflammatory arthritis no tender points noted

## 2021-01-11 NOTE — ASSESSMENT
[FreeTextEntry1] : 40 year old  female with no significant past medical history has a previous diagnosis of Sjogren syndrome. The diagnosis was made several years ago by a rheumatologist based on her symptoms and abnormal labs.\par \par She has a significant family history of autoimmunity with several family members with lupus and rheumatoid arthritis. Her current review of systems is positive for alopecia, Raynaud's phenomenon, dry eyes, dysphagia, fatigue, muscle and joint pain.\par \par She has had an abnormal eye exam in the past. Today, and examination she has full range of motion of her joints without any evidence of inflammatory arthritis. She does not have any tender points on exam.\par \par Based on her history I agree with the diagnosis of Sjogren syndrome. \par \par Sjogren syndrome-\par -she is on Plaquenil 200 mg b.i.d. given dryness in the eyes and mouth and joint pains.\par - she is definitely better with PLQ\par -Given the presence of the Sjögren's antibodies when she gets pregnant the fetus will have to be monitored for heart block\par -I made her aware of this\par -eye exam was normal \par \par Antiphospholipid antibody-\par -she has had positive beta-2 glycoprotein antibodies on 2 occasions now\par -There is a family history of miscarriages\par -She is not contemplating any future pregnancies.\par -She has been started on aspirin by hematology\par \par MGUS-\par Cleared by hematology\par \par ch fatigue-\par responds tometalxone prn\par \par Fatty liver- repeat liver sonogram as pt c/o intermittent abd pain\par \par Followup 5 months. She is aware to call if her symptoms worsen.

## 2021-01-11 NOTE — HISTORY OF PRESENT ILLNESS
[FreeTextEntry1] : Rina is here for a followup . She is on Plaquenil 200 mg b.i.d. She has been doing well. She denies any rashes or fevers.\par She admits to dryness in her mouth and a dry cough intermittently\par She saw hematology and was told to use asa 81 mg qd, she returning to them next month. \par \par She admits to hair thinning but denies patchy alopecia. She admits to dry eyes and dry mouth, she rates her symptoms as a 3/10. She admits to fatigue around a 5/10. She has intermittent aches and pains around 3/10.\par She saw hematology and was noted to have antiphospholipid antibodies, Is not contemplating any pregnancies in the near future\par She states that she has flares intermittently and metalxone does help. \par \par She denies oral ulcers.  She denies chest pain or shortness of breath. She has aches and pains.\par She tries to exercise regularly. She denies any other changes in her health\par She has a good appetite and denies weight loss. She denies fevers or chills or night sweats.

## 2021-01-11 NOTE — REVIEW OF SYSTEMS
[Feeling Tired] : feeling tired [Dry Eyes] : dryness of the eyes [Arthralgias] : arthralgias [Joint Pain] : joint pain [Fever] : no fever [Chills] : no chills [Feeling Poorly] : not feeling poorly [Eye Pain] : no eye pain [Loss Of Hearing] : no hearing loss [Chest Pain] : no chest pain [Palpitations] : no palpitations [Leg Claudication] : no intermittent leg claudication [Lower Ext Edema] : no lower extremity edema [Shortness Of Breath] : no shortness of breath [Cough] : no cough [SOB on Exertion] : no shortness of breath during exertion [Abdominal Pain] : no abdominal pain [Dysuria] : no dysuria [Joint Swelling] : no joint swelling [Joint Stiffness] : no joint stiffness [Skin Lesions] : no skin lesions [Itching] : no itching [Dizziness] : no dizziness [Fainting] : no fainting [Limb Weakness] : no limb weakness [Difficulty Walking] : no difficulty walking [Anxiety] : no anxiety [Depression] : no depression [Muscle Weakness] : no muscle weakness [Easy Bruising] : no tendency for easy bruising

## 2021-01-27 ENCOUNTER — OUTPATIENT (OUTPATIENT)
Dept: OUTPATIENT SERVICES | Facility: HOSPITAL | Age: 41
LOS: 1 days | Discharge: HOME | End: 2021-01-27

## 2021-01-27 DIAGNOSIS — D68.61 ANTIPHOSPHOLIPID SYNDROME: ICD-10-CM

## 2021-01-27 DIAGNOSIS — D47.2 MONOCLONAL GAMMOPATHY: ICD-10-CM

## 2021-01-28 ENCOUNTER — LABORATORY RESULT (OUTPATIENT)
Age: 41
End: 2021-01-28

## 2021-01-28 LAB — LUPUS ANTICOAGULANT CASCADE REFLEX: NORMAL

## 2021-02-01 ENCOUNTER — NON-APPOINTMENT (OUTPATIENT)
Age: 41
End: 2021-02-01

## 2021-02-01 LAB
ALBUMIN SERPL ELPH-MCNC: 4.3 G/DL
ALP BLD-CCNC: 56 U/L
ALT SERPL-CCNC: 18 U/L
ANA SER IF-ACNC: NEGATIVE
ANION GAP SERPL CALC-SCNC: 15 MMOL/L
APPEARANCE: CLEAR
AST SERPL-CCNC: 19 U/L
BASOPHILS # BLD AUTO: 0.02 K/UL
BASOPHILS NFR BLD AUTO: 0.4 %
BILIRUB SERPL-MCNC: 0.4 MG/DL
BILIRUBIN URINE: NEGATIVE
BLOOD URINE: NEGATIVE
BUN SERPL-MCNC: 16 MG/DL
C3 SERPL-MCNC: 83 MG/DL
C4 SERPL-MCNC: 17 MG/DL
CALCIUM SERPL-MCNC: 9.3 MG/DL
CCP AB SER IA-ACNC: <8 UNITS
CHLORIDE SERPL-SCNC: 104 MMOL/L
CO2 SERPL-SCNC: 20 MMOL/L
COLOR: NORMAL
CREAT SERPL-MCNC: 0.82 MG/DL
CREAT SPEC-SCNC: 46 MG/DL
CREAT/PROT UR: NORMAL RATIO
CRP SERPL-MCNC: <0.1 MG/DL
DSDNA AB SER-ACNC: <12 IU/ML
ENA SS-A AB SER IA-ACNC: 2.9 AL
ENA SS-B AB SER IA-ACNC: <0.2 AL
EOSINOPHIL # BLD AUTO: 0.1 K/UL
EOSINOPHIL NFR BLD AUTO: 1.9 %
ERYTHROCYTE [SEDIMENTATION RATE] IN BLOOD BY WESTERGREN METHOD: 3 MM/HR
GLUCOSE QUALITATIVE U: NEGATIVE
GLUCOSE SERPL-MCNC: 88 MG/DL
HCT VFR BLD CALC: 41.8 %
HGB BLD-MCNC: 13.7 G/DL
IMM GRANULOCYTES NFR BLD AUTO: 0.2 %
KETONES URINE: NEGATIVE
LEUKOCYTE ESTERASE URINE: NEGATIVE
LYMPHOCYTES # BLD AUTO: 2.35 K/UL
LYMPHOCYTES NFR BLD AUTO: 45.5 %
MAN DIFF?: NORMAL
MCHC RBC-ENTMCNC: 30.2 PG
MCHC RBC-ENTMCNC: 32.8 GM/DL
MCV RBC AUTO: 92.3 FL
MONOCYTES # BLD AUTO: 0.39 K/UL
MONOCYTES NFR BLD AUTO: 7.5 %
NEUTROPHILS # BLD AUTO: 2.3 K/UL
NEUTROPHILS NFR BLD AUTO: 44.5 %
NITRITE URINE: NEGATIVE
PH URINE: 6.5
PLATELET # BLD AUTO: 212 K/UL
POTASSIUM SERPL-SCNC: 4.4 MMOL/L
PROT SERPL-MCNC: 6.8 G/DL
PROT UR-MCNC: <4 MG/DL
PROTEIN URINE: NEGATIVE
RBC # BLD: 4.53 M/UL
RBC # FLD: 11.9 %
RF+CCP IGG SER-IMP: NEGATIVE
RHEUMATOID FACT SER QL: <10 IU/ML
SODIUM SERPL-SCNC: 139 MMOL/L
SPECIFIC GRAVITY URINE: 1.01
UROBILINOGEN URINE: NORMAL
WBC # FLD AUTO: 5.17 K/UL

## 2021-02-03 ENCOUNTER — RESULT REVIEW (OUTPATIENT)
Age: 41
End: 2021-02-03

## 2021-02-03 ENCOUNTER — APPOINTMENT (OUTPATIENT)
Dept: HEMATOLOGY ONCOLOGY | Facility: CLINIC | Age: 41
End: 2021-02-03
Payer: COMMERCIAL

## 2021-02-03 VITALS
TEMPERATURE: 97.9 F | HEART RATE: 75 BPM | HEIGHT: 65 IN | WEIGHT: 126 LBS | SYSTOLIC BLOOD PRESSURE: 117 MMHG | BODY MASS INDEX: 20.99 KG/M2 | DIASTOLIC BLOOD PRESSURE: 69 MMHG

## 2021-02-03 PROCEDURE — 99213 OFFICE O/P EST LOW 20 MIN: CPT

## 2021-02-03 NOTE — ASSESSMENT
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 No clinical symptoms to suggest underlying plasma cell disorder.\par Clinical presentation c/w low risk MGUS-  low M spike, normal immunoglobulin levels, normal kappa to lambda ratio and no anemia, renal disease.\par We  discussed natural history and small risk of progression to plasma cell disorder .Need for periodic monitoring - every 6 months for now with blood work.\par IFEX was ordered but not done- will obtain labs today and call patient with results. If stable- continue monitoring.\par \par \par Persistent ( on tested multiple times)  elevated beta2 GP IGM No thromboembolic events.\par Continue  low dose ASA 81 mg if tolerated ( optional) .\par Will need VTE prophylaxis in high risk setting ( pregnancy,  surgeries etc) .\par \par RV in 6 months. \par \par

## 2021-02-03 NOTE — HISTORY OF PRESENT ILLNESS
[de-identified] : 40 y/o woman with Sjogren syndrome diagnosed few years ago, currently on Plaquenil- stable. Blood work done by her rheumatologist Dr Hood in November 2019 showed low level monoclonal IgG lambda. No anemia, renal disease, hypercalcemia or bone problems.\par Also she has  hx of elevated antiphospholipid antibodies -beta2 glycoprotein  IgM detected on several occasions with negative rest of APS panel and negative Lupus anticoagulant. \par No hx of thromboembolism. Not on OCP.  No  miscarriages. \par \par \par Baby ASA recommended.  Low risk MGUS- monitoring  [de-identified] : No new issues. On ASA 81 mg - tolerating OK.\par had labs done last week.

## 2021-06-16 NOTE — END OF VISIT
Telephone Encounter by Vera Moncada CMA at 2/28/2020  1:38 PM     Author: Vera Moncada CMA Service: -- Author Type: Certified Medical Assistant    Filed: 2/28/2020  1:40 PM Encounter Date: 2/27/2020 Status: Signed    : Vera Moncada CMA (Certified Medical Assistant)                             [>50% of Time Spent on Counseling and Coordination of Care for  ___] : Greater than 50% of the encounter time was spent on counseling and coordination of care for [unfilled] [Time Spent: ___ minutes] : I have spent [unfilled] minutes of face to face time with the patient

## 2021-06-18 ENCOUNTER — APPOINTMENT (OUTPATIENT)
Dept: RHEUMATOLOGY | Facility: CLINIC | Age: 41
End: 2021-06-18

## 2021-06-29 ENCOUNTER — LABORATORY RESULT (OUTPATIENT)
Age: 41
End: 2021-06-29

## 2021-06-29 LAB
ALBUMIN SERPL ELPH-MCNC: 4.4 G/DL
ALP BLD-CCNC: 52 U/L
ALT SERPL-CCNC: 17 U/L
ANION GAP SERPL CALC-SCNC: 13 MMOL/L
APTT BLD: 32.2 SEC
AST SERPL-CCNC: 15 U/L
BASOPHILS # BLD AUTO: 0.02 K/UL
BASOPHILS NFR BLD AUTO: 0.5 %
BILIRUB SERPL-MCNC: 0.6 MG/DL
BUN SERPL-MCNC: 15 MG/DL
C3 SERPL-MCNC: 90 MG/DL
C4 SERPL-MCNC: 16 MG/DL
CALCIUM SERPL-MCNC: 9.3 MG/DL
CHLORIDE SERPL-SCNC: 105 MMOL/L
CO2 SERPL-SCNC: 20 MMOL/L
CONFIRM: 27.6 SEC
CREAT SERPL-MCNC: 0.81 MG/DL
CREAT SPEC-SCNC: 39 MG/DL
CREAT/PROT UR: 0.1 RATIO
DRVVT IMM 1:2 NP PPP: NORMAL
DRVVT SCREEN TO CONFIRM RATIO: 0.91 RATIO
EOSINOPHIL # BLD AUTO: 0.09 K/UL
EOSINOPHIL NFR BLD AUTO: 2.4 %
GLUCOSE SERPL-MCNC: 91 MG/DL
HCT VFR BLD CALC: 40.6 %
HGB BLD-MCNC: 14 G/DL
IMM GRANULOCYTES NFR BLD AUTO: 0.3 %
LYMPHOCYTES # BLD AUTO: 1.99 K/UL
LYMPHOCYTES NFR BLD AUTO: 52.5 %
MAN DIFF?: NORMAL
MCHC RBC-ENTMCNC: 31.3 PG
MCHC RBC-ENTMCNC: 34.5 GM/DL
MCV RBC AUTO: 90.8 FL
MONOCYTES # BLD AUTO: 0.31 K/UL
MONOCYTES NFR BLD AUTO: 8.2 %
NEUTROPHILS # BLD AUTO: 1.37 K/UL
NEUTROPHILS NFR BLD AUTO: 36.1 %
PLATELET # BLD AUTO: 218 K/UL
POTASSIUM SERPL-SCNC: 4.3 MMOL/L
PROT SERPL-MCNC: 6.9 G/DL
PROT UR-MCNC: 5 MG/DL
RBC # BLD: 4.47 M/UL
RBC # FLD: 11.9 %
SCREEN DRVVT: 33.5 SEC
SODIUM SERPL-SCNC: 138 MMOL/L
WBC # FLD AUTO: 3.79 K/UL

## 2021-06-30 LAB
B2 GLYCOPROT1 IGG SER-ACNC: <5 SGU
B2 GLYCOPROT1 IGM SER-ACNC: 116 SMU
CARDIOLIPIN AB SER IA-ACNC: POSITIVE
DSDNA AB SER-ACNC: <12 IU/ML

## 2021-07-01 ENCOUNTER — NON-APPOINTMENT (OUTPATIENT)
Age: 41
End: 2021-07-01

## 2021-07-01 ENCOUNTER — APPOINTMENT (OUTPATIENT)
Dept: RHEUMATOLOGY | Facility: CLINIC | Age: 41
End: 2021-07-01
Payer: COMMERCIAL

## 2021-07-01 VITALS
DIASTOLIC BLOOD PRESSURE: 74 MMHG | TEMPERATURE: 97.2 F | SYSTOLIC BLOOD PRESSURE: 94 MMHG | OXYGEN SATURATION: 98 % | BODY MASS INDEX: 20.66 KG/M2 | HEART RATE: 71 BPM | WEIGHT: 124 LBS | HEIGHT: 65 IN

## 2021-07-01 LAB — B2 GLYCOPROT1 IGA SERPL IA-ACNC: <5 SAU

## 2021-07-01 PROCEDURE — 99214 OFFICE O/P EST MOD 30 MIN: CPT

## 2021-07-01 PROCEDURE — 99072 ADDL SUPL MATRL&STAF TM PHE: CPT

## 2021-07-01 NOTE — HISTORY OF PRESENT ILLNESS
[FreeTextEntry1] : Rina is here for a followup, Sjogren's syndrome. She is on Plaquenil 200 mg b.i.d. She has been doing well. She denies any rashes or fevers.  Her last eye exam was about 1 year ago.  \par She admits to dryness in her mouth and a dry cough intermittently\par She saw hematology and was told to use asa 81 mg qd, she returning to them next month. \par \par Since last visit, she has noticed being gassy/bloated.  \par It happens in cycles. \par If she has wine, fatty foods, then for the following week she feels bloated.  Then it dissipates over 2 weeks.  \par She had EGD with Dr. Cadena in 2015.  They didn't see celiac.  \par \par \par She admits to hair thinning but denies patchy alopecia. She admits to dry eyes and dry mouth, she rates her symptoms as a 3/10. She admits to fatigue around a 5/10. She has intermittent aches and pains around 3/10.\par She saw hematology and was noted to have antiphospholipid antibodies, Is not contemplating any pregnancies in the near future\par She states that she has flares intermittently and metalxone does help. \par \par She denies oral ulcers. She denies chest pain or shortness of breath. She has aches and pains.\par She tries to exercise regularly. She denies any other changes in her health\par She has a good appetite and denies weight loss. She denies fevers or chills or night sweats. \par  \par

## 2021-07-01 NOTE — ASSESSMENT
[FreeTextEntry1] : 41 year old  female with no significant past medical history has a previous diagnosis of Sjogren syndrome. The diagnosis was made several years ago by a rheumatologist based on her symptoms and abnormal labs.\par \par She has a significant family history of autoimmunity with several family members with lupus and rheumatoid arthritis. Her current review of systems is positive for alopecia, Raynaud's phenomenon, dry eyes, dysphagia, fatigue, muscle and joint pain.\par \par She has had an abnormal eye exam in the past. Today, and examination she has full range of motion of her joints without any evidence of inflammatory arthritis. She does not have any tender points on exam.\par \par Based on her history I agree with the diagnosis of Sjogren syndrome. \par \par Sjogren syndrome-\par -she is on Plaquenil 200 mg b.i.d. given dryness in the eyes and mouth and joint pains.\par - she is definitely better with PLQ.  Reminded her she is overdue for eye exam.  \par -Given the presence of the Sjögren's antibodies when she gets pregnant the fetus will have to be monitored for heart block\par -I made her aware of this\par -eye exam was normal \par \par Antiphospholipid antibody-\par -she has had positive beta-2 glycoprotein antibodies on 2 occasions now\par -There is a family history of miscarriages\par -She is not contemplating any future pregnancies.\par -She has been started on aspirin by hematology.  Has reported some bruising, also has recent abd discomfort for which I have advised her to see GI.  Advised to discuss with heme the aspirin.  She also has borderline low WBC which I have advised her to discuss with them also.      \par \par Abd bloating/discomfort\par -Trial nexium\par -GI eval\par \par MGUS-\par Cleared by hematology\par \par ch fatigue-\par responds tometalxone prn\par \par Fatty liver- repeat liver sonogram as pt c/o intermittent abd pain\par \par Followup 5-6 months. She is aware to call if her symptoms worsen. \par \par  \par

## 2021-08-18 ENCOUNTER — RX RENEWAL (OUTPATIENT)
Age: 41
End: 2021-08-18

## 2021-08-19 LAB
ALBUMIN SERPL ELPH-MCNC: 4.6 G/DL
ALP BLD-CCNC: 55 U/L
ALT SERPL-CCNC: 11 U/L
ANION GAP SERPL CALC-SCNC: 10 MMOL/L
AST SERPL-CCNC: 17 U/L
BASOPHILS # BLD AUTO: 0.02 K/UL
BASOPHILS NFR BLD AUTO: 0.5 %
BILIRUB SERPL-MCNC: 0.7 MG/DL
BUN SERPL-MCNC: 13 MG/DL
CALCIUM SERPL-MCNC: 9.6 MG/DL
CHLORIDE SERPL-SCNC: 104 MMOL/L
CO2 SERPL-SCNC: 22 MMOL/L
CREAT SERPL-MCNC: 0.76 MG/DL
EOSINOPHIL # BLD AUTO: 0.1 K/UL
EOSINOPHIL NFR BLD AUTO: 2.3 %
GLUCOSE SERPL-MCNC: 93 MG/DL
HCT VFR BLD CALC: 42.1 %
HGB BLD-MCNC: 14 G/DL
IMM GRANULOCYTES NFR BLD AUTO: 0.5 %
LYMPHOCYTES # BLD AUTO: 2.11 K/UL
LYMPHOCYTES NFR BLD AUTO: 48 %
MAN DIFF?: NORMAL
MCHC RBC-ENTMCNC: 30.9 PG
MCHC RBC-ENTMCNC: 33.3 GM/DL
MCV RBC AUTO: 92.9 FL
MONOCYTES # BLD AUTO: 0.39 K/UL
MONOCYTES NFR BLD AUTO: 8.9 %
NEUTROPHILS # BLD AUTO: 1.76 K/UL
NEUTROPHILS NFR BLD AUTO: 39.8 %
PLATELET # BLD AUTO: 240 K/UL
POTASSIUM SERPL-SCNC: 4.3 MMOL/L
PROT SERPL-MCNC: 7 G/DL
RBC # BLD: 4.53 M/UL
RBC # FLD: 11.9 %
SODIUM SERPL-SCNC: 137 MMOL/L
WBC # FLD AUTO: 4.4 K/UL

## 2021-08-20 ENCOUNTER — OUTPATIENT (OUTPATIENT)
Dept: OUTPATIENT SERVICES | Facility: HOSPITAL | Age: 41
LOS: 1 days | Discharge: HOME | End: 2021-08-20

## 2021-08-20 DIAGNOSIS — D47.2 MONOCLONAL GAMMOPATHY: ICD-10-CM

## 2021-08-20 LAB
DEPRECATED KAPPA LC FREE/LAMBDA SER: 0.82 RATIO
KAPPA LC CSF-MCNC: 1.8 MG/DL
KAPPA LC SERPL-MCNC: 1.47 MG/DL

## 2021-08-24 ENCOUNTER — APPOINTMENT (OUTPATIENT)
Dept: HEMATOLOGY ONCOLOGY | Facility: CLINIC | Age: 41
End: 2021-08-24
Payer: COMMERCIAL

## 2021-08-24 VITALS
SYSTOLIC BLOOD PRESSURE: 108 MMHG | HEIGHT: 66 IN | TEMPERATURE: 98.1 F | DIASTOLIC BLOOD PRESSURE: 73 MMHG | BODY MASS INDEX: 19.93 KG/M2 | HEART RATE: 84 BPM | WEIGHT: 124 LBS

## 2021-08-24 DIAGNOSIS — D68.61 ANTIPHOSPHOLIPID SYNDROME: ICD-10-CM

## 2021-08-24 PROCEDURE — 99213 OFFICE O/P EST LOW 20 MIN: CPT

## 2021-08-24 NOTE — PHYSICAL EXAM
[Normal] : well developed, well nourished, in no acute distress [de-identified] : few tiny bruises lower extremities

## 2021-08-24 NOTE — ASSESSMENT
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 No clinical symptoms to suggest underlying plasma cell disorder.\par Clinical presentation c/w low risk MGUS-  low M spike, normal immunoglobulin levels, normal kappa to lambda ratio and no anemia, renal disease. Stable in few years.\par  Again we  discussed natural history and small risk of progression to plasma cell disorder .Need for periodic monitoring - yearly for now in view of stability.\par \par Persistent ( on tested multiple times)  elevated beta2 GP IGM No thromboembolic events.\par Continue  low dose ASA 81 mg if tolerated ( optional) . Can lower to ASA 81 mg every other day.\par Will need VTE prophylaxis in high risk setting ( pregnancy,  surgeries etc) .\par \par RV in one year/ sooner PRN. \par \par

## 2021-08-24 NOTE — HISTORY OF PRESENT ILLNESS
[de-identified] : 40 y/o woman with Sjogren syndrome diagnosed few years ago, currently on Plaquenil- stable. Blood work done by her rheumatologist Dr Hood in November 2019 showed low level monoclonal IgG lambda. No anemia, renal disease, hypercalcemia or bone problems.\par Also she has  hx of elevated antiphospholipid antibodies -beta2 glycoprotein  IgM detected on several occasions ( ~ titer 100 )  with negative rest of APS panel and negative Lupus anticoagulant. \par No hx of thromboembolism. Not on OCP.  No  miscarriages. Takes Baby ASA. \par Low risk MGUS- monitoring  [de-identified] : No new issues. On ASA 81 mg -but noticed more bruising and she is worried about it.

## 2021-08-24 NOTE — REASON FOR VISIT
[Follow-Up Visit] : a follow-up visit for [FreeTextEntry2] : IgG lambda MGUS , antiphospholipid antibodies ( B2 GP IgM ~ 100)

## 2021-08-26 LAB
ALBUMIN MFR SERPL ELPH: 59.5 %
ALBUMIN SERPL-MCNC: 4.2 G/DL
ALBUMIN/GLOB SERPL: 1.5 RATIO
ALPHA1 GLOB MFR SERPL ELPH: 2.8 %
ALPHA1 GLOB SERPL ELPH-MCNC: 0.2 G/DL
ALPHA2 GLOB MFR SERPL ELPH: 8.6 %
ALPHA2 GLOB SERPL ELPH-MCNC: 0.6 G/DL
B-GLOBULIN MFR SERPL ELPH: 10.9 %
B-GLOBULIN SERPL ELPH-MCNC: 0.8 G/DL
DEPRECATED KAPPA LC FREE/LAMBDA SER: 0.82 RATIO
GAMMA GLOB FLD ELPH-MCNC: 1.3 G/DL
GAMMA GLOB MFR SERPL ELPH: 18.2 %
IGA SER QL IEP: 214 MG/DL
IGG SER QL IEP: 1280 MG/DL
IGM SER QL IEP: 182 MG/DL
INTERPRETATION SERPL IEP-IMP: NORMAL
KAPPA LC CSF-MCNC: 1.8 MG/DL
KAPPA LC SERPL-MCNC: 1.47 MG/DL
M PROTEIN MFR SERPL ELPH: 2.9 %
M PROTEIN SPEC IFE-MCNC: NORMAL
MONOCLON BAND OBS SERPL: 0.2 G/DL
PROT SERPL-MCNC: 7 G/DL
PROT SERPL-MCNC: 7 G/DL

## 2022-01-04 ENCOUNTER — APPOINTMENT (OUTPATIENT)
Dept: RHEUMATOLOGY | Facility: CLINIC | Age: 42
End: 2022-01-04
Payer: COMMERCIAL

## 2022-01-04 VITALS
SYSTOLIC BLOOD PRESSURE: 114 MMHG | TEMPERATURE: 97.8 F | DIASTOLIC BLOOD PRESSURE: 75 MMHG | BODY MASS INDEX: 20.41 KG/M2 | WEIGHT: 127 LBS | HEART RATE: 72 BPM | HEIGHT: 66 IN | OXYGEN SATURATION: 98 %

## 2022-01-04 DIAGNOSIS — M25.50 PAIN IN UNSPECIFIED JOINT: ICD-10-CM

## 2022-01-04 PROCEDURE — 99214 OFFICE O/P EST MOD 30 MIN: CPT | Mod: 25

## 2022-01-04 PROCEDURE — 36415 COLL VENOUS BLD VENIPUNCTURE: CPT

## 2022-01-04 RX ORDER — ALBUTEROL SULFATE 90 UG/1
108 (90 BASE) INHALANT RESPIRATORY (INHALATION)
Qty: 8 | Refills: 0 | Status: DISCONTINUED | COMMUNITY
Start: 2021-12-22

## 2022-01-04 NOTE — HISTORY OF PRESENT ILLNESS
[FreeTextEntry1] : Rina is here for a followup, Sjogren's syndrome. She is on Plaquenil 200 mg b.i.d. She has been doing well.\par \par She still has bloating.  It usually happens at work during lunch, so now she thinks it is related to anxiety.\par \par She has had some urinary wbc, rbc, not UTI.  No hx of kidney stone.  \par \par She denies any rashes or fevers. Her last eye exam was about 1 year ago. \par She admits to dryness in her mouth and a dry cough intermittently\par She saw hematology and was told to use asa 81 mg qd, she returning to them next month. \par \par She saw her GI, had endoscopy, was told it was negative.  \par \par Last eye exam was in 7/2021.  \par \par She admits to hair thinning but denies patchy alopecia. She admits to dry eyes and dry mouth, she rates her symptoms as a 3/10. She admits to fatigue around a 5/10. She has intermittent aches and pains around 3/10.\par \par She was diagnosed with pet allergies. Was given an inhaler, but hasn't used it.  \par Denies chest pressure or SOB.  \par She has a good appetite and denies weight loss. She denies fevers or chills or night sweats. \par \par She saw hematology and was noted to have antiphospholipid antibodies, Is not contemplating any pregnancies in the near future\par She states that she has flares intermittently and metalxone does help. \par \par

## 2022-01-04 NOTE — ASSESSMENT
[FreeTextEntry1] : 41 year old  female with no significant past medical history has a previous diagnosis of Sjogren syndrome. The diagnosis was made several years ago by a rheumatologist based on her symptoms and abnormal labs.\par \par She has a significant family history of autoimmunity with several family members with lupus and rheumatoid arthritis. Her current review of systems is positive for alopecia, Raynaud's phenomenon, dry eyes, dysphagia, fatigue, muscle and joint pain.\par \par She has had an abnormal eye exam in the past. Today, and examination she has full range of motion of her joints without any evidence of inflammatory arthritis. She does not have any tender points on exam.\par \par Based on her history I agree with the diagnosis of Sjogren syndrome. \par \par Sjogren syndrome-\par -she is on Plaquenil 200 mg b.i.d. given dryness in the eyes and mouth and joint pains.\par - she is definitely better with PLQ.  She did have her eye exam.  7/2021.  \par -Given the presence of the Sjögren's antibodies when she gets pregnant the fetus will have to be monitored for heart block\par -I made her aware of this\par \par \par Antiphospholipid antibody-\par -she has had positive beta-2 glycoprotein antibodies on 2 occasions now\par -There is a family history of miscarriages\par -She is not contemplating any future pregnancies.\par -She has been started on aspirin by hematology. Has reported some bruising, also has recent abd discomfort for which I have advised her to see GI. Advised to discuss with heme the aspirin. She also has borderline low WBC which I have advised her to discuss with them also. \par \par Abd bloating/discomfort- maybe anxiety related she now feels.  \par -GI eval\par \par MGUS-\par Cleared by hematology\par \par ch fatigue-\par responds to metalxone prn\par \par Fatty liver- repeat liver sonogram as pt c/o intermittent abd pain\par \par Followup 5-6 months. She is aware to call if her symptoms worsen. \par \par  \par  \par \par

## 2022-01-05 ENCOUNTER — TRANSCRIPTION ENCOUNTER (OUTPATIENT)
Age: 42
End: 2022-01-05

## 2022-01-05 LAB
APTT BLD: 33.7 SEC
C3 SERPL-MCNC: 90 MG/DL
C4 SERPL-MCNC: 17 MG/DL
CONFIRM: 29.3 SEC
DRVVT IMM 1:2 NP PPP: NORMAL
DRVVT SCREEN TO CONFIRM RATIO: 0.85 RATIO
RHEUMATOID FACT SER QL: <10 IU/ML
SCREEN DRVVT: 33.7 SEC

## 2022-01-06 LAB
CARDIOLIPIN AB SER IA-ACNC: NEGATIVE
ENA SS-A AB SER IA-ACNC: 3.7 AL
ENA SS-B AB SER IA-ACNC: <0.2 AL

## 2022-01-10 LAB
B2 GLYCOPROT1 IGA SERPL IA-ACNC: <5 SAU
B2 GLYCOPROT1 IGG SER-ACNC: <5 SGU
B2 GLYCOPROT1 IGM SER-ACNC: 107.5 SMU

## 2022-06-30 ENCOUNTER — LABORATORY RESULT (OUTPATIENT)
Age: 42
End: 2022-06-30

## 2022-07-04 LAB
ALBUMIN SERPL ELPH-MCNC: 4.4 G/DL
ALP BLD-CCNC: 55 U/L
ALT SERPL-CCNC: 16 U/L
ANION GAP SERPL CALC-SCNC: 14 MMOL/L
APPEARANCE: CLEAR
APTT BLD: 32.9 SEC
AST SERPL-CCNC: 17 U/L
B2 GLYCOPROT1 IGA SERPL IA-ACNC: <5 SAU
B2 GLYCOPROT1 IGG SER-ACNC: <5 SGU
B2 GLYCOPROT1 IGM SER-ACNC: 79.9 SMU
BASOPHILS # BLD AUTO: 0.02 K/UL
BASOPHILS NFR BLD AUTO: 0.5 %
BILIRUB SERPL-MCNC: 0.7 MG/DL
BILIRUBIN URINE: NEGATIVE
BLOOD URINE: NEGATIVE
BUN SERPL-MCNC: 11 MG/DL
C3 SERPL-MCNC: 97 MG/DL
C4 SERPL-MCNC: 17 MG/DL
CALCIUM SERPL-MCNC: 9.4 MG/DL
CARDIOLIPIN AB SER IA-ACNC: NEGATIVE
CHLORIDE SERPL-SCNC: 105 MMOL/L
CO2 SERPL-SCNC: 20 MMOL/L
COLOR: COLORLESS
CONFIRM: 28.6 SEC
CREAT SERPL-MCNC: 0.79 MG/DL
DRVVT IMM 1:2 NP PPP: NORMAL
DRVVT SCREEN TO CONFIRM RATIO: 0.91 RATIO
EGFR: 96 ML/MIN/1.73M2
ENA SS-A AB SER IA-ACNC: 2.6 AL
ENA SS-B AB SER IA-ACNC: <0.2 AL
EOSINOPHIL # BLD AUTO: 0.06 K/UL
EOSINOPHIL NFR BLD AUTO: 1.5 %
GLUCOSE QUALITATIVE U: NEGATIVE
GLUCOSE SERPL-MCNC: 87 MG/DL
HCT VFR BLD CALC: 40.6 %
HGB BLD-MCNC: 13.7 G/DL
IMM GRANULOCYTES NFR BLD AUTO: 0.2 %
KETONES URINE: NORMAL
LEUKOCYTE ESTERASE URINE: NEGATIVE
LYMPHOCYTES # BLD AUTO: 1.7 K/UL
LYMPHOCYTES NFR BLD AUTO: 41.8 %
MAN DIFF?: NORMAL
MCHC RBC-ENTMCNC: 30.8 PG
MCHC RBC-ENTMCNC: 33.7 GM/DL
MCV RBC AUTO: 91.2 FL
MONOCYTES # BLD AUTO: 0.28 K/UL
MONOCYTES NFR BLD AUTO: 6.9 %
NEUTROPHILS # BLD AUTO: 2 K/UL
NEUTROPHILS NFR BLD AUTO: 49.1 %
NITRITE URINE: NEGATIVE
PH URINE: 6
PLATELET # BLD AUTO: 243 K/UL
POTASSIUM SERPL-SCNC: 4.4 MMOL/L
PROT SERPL-MCNC: 6.9 G/DL
PROTEIN URINE: NEGATIVE
RBC # BLD: 4.45 M/UL
RBC # FLD: 12 %
RHEUMATOID FACT SER QL: <10 IU/ML
SCREEN DRVVT: 30.8 SEC
SODIUM SERPL-SCNC: 138 MMOL/L
SPECIFIC GRAVITY URINE: 1.01
UROBILINOGEN URINE: NORMAL
WBC # FLD AUTO: 4.07 K/UL

## 2022-07-05 ENCOUNTER — APPOINTMENT (OUTPATIENT)
Dept: RHEUMATOLOGY | Facility: CLINIC | Age: 42
End: 2022-07-05

## 2022-07-05 VITALS
TEMPERATURE: 97.6 F | WEIGHT: 128 LBS | BODY MASS INDEX: 20.66 KG/M2 | HEART RATE: 79 BPM | OXYGEN SATURATION: 99 % | DIASTOLIC BLOOD PRESSURE: 69 MMHG | SYSTOLIC BLOOD PRESSURE: 107 MMHG

## 2022-07-05 PROCEDURE — 99214 OFFICE O/P EST MOD 30 MIN: CPT

## 2022-07-05 RX ORDER — EPINEPHRINE 0.3 MG/.3ML
0.3 INJECTION INTRAMUSCULAR
Qty: 2 | Refills: 0 | Status: DISCONTINUED | COMMUNITY
Start: 2021-12-14 | End: 2022-07-05

## 2022-07-05 RX ORDER — METHYLPREDNISOLONE 4 MG/1
4 TABLET ORAL
Qty: 21 | Refills: 0 | Status: DISCONTINUED | COMMUNITY
Start: 2022-04-26

## 2022-07-05 RX ORDER — OMEPRAZOLE 20 MG/1
20 TABLET, DELAYED RELEASE ORAL
Refills: 0 | Status: DISCONTINUED | COMMUNITY
End: 2022-07-05

## 2022-07-05 NOTE — ASSESSMENT
[FreeTextEntry1] : \par 42 year old  female with no significant past medical history has a previous diagnosis of Sjogren syndrome. The diagnosis was made several years ago by a rheumatologist based on her symptoms and abnormal labs.\par \par She has a significant family history of autoimmunity with several family members with lupus and rheumatoid arthritis. Her current review of systems is positive for alopecia, Raynaud's phenomenon, dry eyes, fatigue, muscle and joint pain.\par \par She has had an abnormal eye exam in the past. Today, and examination she has full range of motion of her joints without any evidence of inflammatory arthritis. She does not have any tender points on exam.\par \par Based on her history I agree with the diagnosis of Sjogren syndrome. \par \par Sjogren syndrome-\par -she is on Plaquenil 200 mg b.i.d. given dryness in the eyes and mouth and joint pains.  Advised to decrease weekend second dosing to adjust for weight based dosing.  \par - she is definitely better with PLQ. She did have her eye exam. 7/2021. Plans to go again.  \par -Given the presence of the Sjögren's antibodies when she gets pregnant the fetus will have to be monitored for heart block\par -I made her aware of this\par -Warming measures for Raynaud's.  \par \par Neck pain\par -States she has hx of small herniated disc.  PT helped her neck in the past.  New referral given. If persistent afterwards, to see her spine doctor again. \par Antiphospholipid antibody-\par -she has had positive beta-2 glycoprotein antibodies on 2 occasions now\par -There is a family history of miscarriages\par -She is not contemplating any future pregnancies.\par -She has been started on aspirin QOD by hematology. Has reported much improvement in the previously frequent bruising, also has recent abd discomfort for which I have advised her to see GI.  She previously had one low WBC, but not persistent. \par \par Abd bloating/discomfort- maybe anxiety related she now feels. \par -GI eval\par \par MGUS-\par Cleared by hematology- clinical surveilance.  Advised to have follow ups periodically. \par \par ch fatigue-\par responds to metalxone prn\par \par Intermittent knee pain, usually following exercise\par -Advised activity modification on knees\par  \par Followup 5-6 months. She is aware to call if her symptoms worsen. \par \par

## 2022-07-05 NOTE — HISTORY OF PRESENT ILLNESS
[FreeTextEntry1] : Rina is here for a followup, Sjogren's syndrome. She is on Plaquenil 200 mg b.i.d. She has been doing well. She denies any rashes or fevers. Her last eye exam was about 1 year ago. \par She admits to dryness in her mouth and a dry cough intermittently\par She saw hematology and was told to use asa 81 mg qd, she returning to them next month. \par \par She uses eye drops in AM and PM.\par If she doesn't drink water, she will notice some soreness on left side.  \par \par She denies any rashes or fevers. Her last eye exam was about 1 year ago. \par She has no cough.  \par \par She dropped ASA 81mg to QOD for the easy bruising.  \par She saw her GI, had endoscopy, was told it was negative. \par \par She has intermittent pain in knees.  Does do jumps in her exercise routine.  \par Pain is 4/10.\par \par +Raynaud's, in the air conditioning at work.  Is able to return to normal color within minutes upon rewarming.  No painful digits, no ulcerations.  \par \par She saw hematology and was noted to have antiphospholipid antibodies, Is not contemplating any pregnancies in the near future\par She states that she has flares intermittently and metalxone does help. \par \par

## 2022-07-12 ENCOUNTER — RX RENEWAL (OUTPATIENT)
Age: 42
End: 2022-07-12

## 2022-09-01 ENCOUNTER — OUTPATIENT (OUTPATIENT)
Dept: OUTPATIENT SERVICES | Facility: HOSPITAL | Age: 42
LOS: 1 days | Discharge: HOME | End: 2022-09-01

## 2022-09-01 DIAGNOSIS — D47.2 MONOCLONAL GAMMOPATHY: ICD-10-CM

## 2022-09-02 ENCOUNTER — LABORATORY RESULT (OUTPATIENT)
Age: 42
End: 2022-09-02

## 2022-09-07 ENCOUNTER — APPOINTMENT (OUTPATIENT)
Dept: HEMATOLOGY ONCOLOGY | Facility: CLINIC | Age: 42
End: 2022-09-07

## 2022-09-07 VITALS
DIASTOLIC BLOOD PRESSURE: 68 MMHG | HEART RATE: 74 BPM | TEMPERATURE: 98.2 F | RESPIRATION RATE: 18 BRPM | OXYGEN SATURATION: 98 % | SYSTOLIC BLOOD PRESSURE: 106 MMHG | WEIGHT: 129 LBS | BODY MASS INDEX: 20.82 KG/M2

## 2022-09-07 DIAGNOSIS — U07.1 COVID-19: ICD-10-CM

## 2022-09-07 PROCEDURE — 99213 OFFICE O/P EST LOW 20 MIN: CPT

## 2022-09-10 RX ORDER — ASPIRIN ENTERIC COATED TABLETS 81 MG 81 MG/1
81 TABLET, DELAYED RELEASE ORAL
Refills: 0 | Status: ACTIVE | COMMUNITY
Start: 2021-02-03

## 2022-09-10 NOTE — HISTORY OF PRESENT ILLNESS
[de-identified] : JOSE ALEJANDRO GURROLA is a 42 y.o.F who we are following for a low grade MGUS and antiphospholipid antibody positive.\par \par Patient has been seeing rheumatology for a hx of  Sjogren's syndrome - currently stable on Plaquenil. \par November 2019 - Blood work done by her rheumatologist Dr Hood showed low level monoclonal IgG lambda. No anemia, renal disease, hypercalcemia or bone problems.\par She also has hx of elevated antiphospholipid antibodies - beta2 glycoprotein IgM detected on several occasions ( ~ titer 100 )  with rest of APS panel  negative and negative Lupus anticoagulant panel. \par No hx of thromboembolism. Not on OCP.  No  miscarriages. Takes Baby ASA. \par Has remained under observation.  [de-identified] : Patient reports due to increased bruising ASA was changed to QOD.  This is agreeing with her much better.\par Taking Plaquenil 2 tabs M-F, 1 tab Sat, Sun.\par Had COVID 4/2022.  \par Now feeling same as usual. \par No other changes in her family, medical, or social history since her last visit of 8/24/21.

## 2022-09-10 NOTE — REVIEW OF SYSTEMS
[Patient Intake Form Reviewed] : Patient intake form was reviewed [Joint Pain] : joint pain [Joint Stiffness] : joint stiffness [Anxiety] : anxiety [Easy Bruising] : a tendency for easy bruising [Negative] : Allergic/Immunologic [FreeTextEntry5] : has occasional palpitations - states saw cardiology and was told all was OK.

## 2022-09-10 NOTE — REASON FOR VISIT
[Follow-Up Visit] : a follow-up visit for [FreeTextEntry2] : IgG lambda MGUS, antiphospholipid antibodies (B2 GP IgM ~ 100)

## 2022-09-10 NOTE — ASSESSMENT
[FreeTextEntry1] : Patient is a 42 y.o. with a low risk MGUS - very low level of monoclonal IgG lambda incidentally detected on blood work in 2019. No clinical symptoms to suggest underlying plasma cell disorder.\par M spike remains low/stable at 0.2. Immunoglobulin levels, Kappa/lambda FLC ratio has been normal, and she has no evidence of anemia or CRI.  \par Continue with periodic monitoring. OK to come yearly given stability. \par \par Patient also with persistent elevated B2 Glyco IgM.  No thromboembolic events.\par Continue low dose ASA 81 mg every other day.\par Will need VTE prophylaxis in high risk setting (surgeries, etc) .\par \par RV in one year/ sooner PRN. \par Given Rx to have labs done prior to next visit. \par \par Severiano Herrera (PCP)\par Katelin Buckner (Rheum)\par Chad Dunaway (GYN)\par

## 2022-09-10 NOTE — PHYSICAL EXAM
[Normal] : affect appropriate [de-identified] : anicteric [de-identified] : no c/c/e [de-identified] : no rashes

## 2022-09-10 NOTE — RESULTS/DATA
[FreeTextEntry1] : Labs done 6/30/22: WBC: 4.07,  Hgb: 13.7,  Hct: 30.8,  MCV: 91.2,  Plts: 243\par                                 CMP: significant for creat: 0.79, Calcium: 9.4\par \par Labs done 9/2/22:  M-spike 0.2, +IgG Lambda.  QIG's all WNL,  K/K FLC Ratio: WNL\par

## 2022-09-11 DIAGNOSIS — D68.61 ANTIPHOSPHOLIPID SYNDROME: ICD-10-CM

## 2022-12-12 NOTE — ED ADULT TRIAGE NOTE - NS ED TRIAGE AVPU SCALE
Alert-The patient is alert, awake and responds to voice. The patient is oriented to time, place, and person. The triage nurse is able to obtain subjective information. Awake/Alert/Cooperative

## 2023-01-05 ENCOUNTER — LABORATORY RESULT (OUTPATIENT)
Age: 43
End: 2023-01-05

## 2023-01-05 LAB
DEPRECATED KAPPA LC FREE/LAMBDA SER: 1.24 RATIO
KAPPA LC CSF-MCNC: 1.57 MG/DL
KAPPA LC SERPL-MCNC: 1.95 MG/DL

## 2023-01-06 LAB
ALBUMIN SERPL ELPH-MCNC: 4.2 G/DL
ALP BLD-CCNC: 60 U/L
ALT SERPL-CCNC: 13 U/L
ANION GAP SERPL CALC-SCNC: 10 MMOL/L
APPEARANCE: CLEAR
AST SERPL-CCNC: 17 U/L
BASOPHILS # BLD AUTO: 0.03 K/UL
BASOPHILS NFR BLD AUTO: 0.7 %
BILIRUB SERPL-MCNC: 0.3 MG/DL
BILIRUBIN URINE: NEGATIVE
BLOOD URINE: NEGATIVE
BUN SERPL-MCNC: 12 MG/DL
C3 SERPL-MCNC: 86 MG/DL
C4 SERPL-MCNC: 17 MG/DL
CALCIUM SERPL-MCNC: 9 MG/DL
CHLORIDE SERPL-SCNC: 105 MMOL/L
CO2 SERPL-SCNC: 22 MMOL/L
COLOR: NORMAL
CONFIRM: 27.7 SEC
CREAT SERPL-MCNC: 0.82 MG/DL
CRP SERPL-MCNC: <3 MG/L
DRVVT IMM 1:2 NP PPP: NORMAL
DRVVT SCREEN TO CONFIRM RATIO: 0.88 RATIO
EGFR: 92 ML/MIN/1.73M2
ENA RNP AB SER IA-ACNC: <0.2 AL
ENA SM AB SER IA-ACNC: <0.2 AL
ENA SS-A AB SER IA-ACNC: 2.2 AL
ENA SS-B AB SER IA-ACNC: <0.2 AL
EOSINOPHIL # BLD AUTO: 0.09 K/UL
EOSINOPHIL NFR BLD AUTO: 2.1 %
ERYTHROCYTE [SEDIMENTATION RATE] IN BLOOD BY WESTERGREN METHOD: 6 MM/HR
GLUCOSE QUALITATIVE U: NEGATIVE
GLUCOSE SERPL-MCNC: 86 MG/DL
HCT VFR BLD CALC: 39.4 %
HGB BLD-MCNC: 12.7 G/DL
IMM GRANULOCYTES NFR BLD AUTO: 0 %
KETONES URINE: NEGATIVE
LEUKOCYTE ESTERASE URINE: ABNORMAL
LYMPHOCYTES # BLD AUTO: 2.14 K/UL
LYMPHOCYTES NFR BLD AUTO: 48.9 %
MAN DIFF?: NORMAL
MCHC RBC-ENTMCNC: 29 PG
MCHC RBC-ENTMCNC: 32.2 GM/DL
MCV RBC AUTO: 90 FL
MONOCYTES # BLD AUTO: 0.34 K/UL
MONOCYTES NFR BLD AUTO: 7.8 %
NEUTROPHILS # BLD AUTO: 1.78 K/UL
NEUTROPHILS NFR BLD AUTO: 40.5 %
NITRITE URINE: NEGATIVE
PH URINE: 6
PLATELET # BLD AUTO: 240 K/UL
POTASSIUM SERPL-SCNC: 4.3 MMOL/L
PROT SERPL-MCNC: 7 G/DL
PROTEIN URINE: ABNORMAL
RBC # BLD: 4.38 M/UL
RBC # FLD: 13.6 %
RHEUMATOID FACT SER QL: <10 IU/ML
SCREEN DRVVT: 29 SEC
SODIUM SERPL-SCNC: 137 MMOL/L
SPECIFIC GRAVITY URINE: 1.02
UROBILINOGEN URINE: NORMAL
WBC # FLD AUTO: 4.38 K/UL

## 2023-01-10 ENCOUNTER — APPOINTMENT (OUTPATIENT)
Dept: RHEUMATOLOGY | Facility: CLINIC | Age: 43
End: 2023-01-10
Payer: COMMERCIAL

## 2023-01-10 DIAGNOSIS — R82.90 UNSPECIFIED ABNORMAL FINDINGS IN URINE: ICD-10-CM

## 2023-01-10 DIAGNOSIS — R53.82 CHRONIC FATIGUE, UNSPECIFIED: ICD-10-CM

## 2023-01-10 LAB
ANA SER IF-ACNC: NEGATIVE
B2 GLYCOPROT1 IGA SERPL IA-ACNC: <5 SAU
B2 GLYCOPROT1 IGG SER-ACNC: <5 SGU
B2 GLYCOPROT1 IGM SER-ACNC: 90.2 SMU
CARDIOLIPIN IGM SER-MCNC: 11.2 MPL
CARDIOLIPIN IGM SER-MCNC: <5 GPL
CCP AB SER IA-ACNC: <8 UNITS
DSDNA AB SER-ACNC: <12 IU/ML
RF+CCP IGG SER-IMP: NEGATIVE

## 2023-01-10 PROCEDURE — 99214 OFFICE O/P EST MOD 30 MIN: CPT | Mod: 95

## 2023-01-10 RX ORDER — LORATADINE 5 MG/5 ML
10 SOLUTION, ORAL ORAL
Refills: 0 | Status: ACTIVE | COMMUNITY

## 2023-01-10 NOTE — HISTORY OF PRESENT ILLNESS
[FreeTextEntry1] : Verbal consent was obtained from patient for 2 way video telehealth visit.  \par Patient was located at her home in [place].  Provider was located in her home in [place]. \par \par \par 41 y/o woman with hx Sjogren's syndrome. She is on Plaquenil. She has been doing well. She denies any rashes or fevers. Her last eye exam was about 1 year ago. \par She admits to dryness in her mouth and a dry cough intermittently\par She saw hematology and was told to use asa 81 mg QOD.\par \par She uses eye drops in AM and PM.\par \par PLQ (last eye screening september 2022)\par \par Occ gets tired/achy\par She goes to bed, wakes up and feels better.\par In the past 2 weeks it happened 3 times. \par No sweats or fevers when that happens.  \par Denies urinary symptoms\par \par 3-4x/week\par Does cardio, weights.\par \par Has dentist appointments next week.\par \par She dropped ASA 81mg to QOD for the easy bruising. \par She saw her GI, had endoscopy, was told it was negative. \par \par +Raynaud's, in the air conditioning at work. Is able to return to normal color within minutes upon rewarming. No painful digits, no ulcerations. \par \par \par She saw hematology and was noted to have antiphospholipid antibodies, Is not contemplating any pregnancies in the near future\par She states that she has flares intermittently and metaxalone does help. \par \par \par

## 2023-01-10 NOTE — ASSESSMENT
[FreeTextEntry1] : 42 year old  female with no significant past medical history has a previous diagnosis of Sjogren syndrome. The diagnosis was made several years ago by a rheumatologist based on her symptoms and abnormal labs.\par \par She has a significant family history of autoimmunity with several family members with lupus and rheumatoid arthritis. Her current review of systems is positive for alopecia, Raynaud's phenomenon, dry eyes, fatigue, muscle and joint pain.\par \par Based on her history I agree with the diagnosis of Sjogren syndrome. \par \par Sjogren syndrome-\par -she is on Plaquenil 200 mg b.i.d. Mon-Fri and once daily saturday and sunday given weight.\par - she is definitely better with PLQ. She did have her eye exam 09/2022. Plans to go again. \par -Given the presence of the Sjögren's antibodies when she gets pregnant the fetus will have to be monitored for heart block\par -I made her aware of this\par -Warming measures for Raynaud's. \par \par Abn urine\par -repeat UA, UCx\par -Does not seem to have symptoms of UTI\par \par Neck pain- seems to not be an issue this visit\par -States she has hx of small herniated disc. PT helped her neck in the past. New referral given. If persistent afterwards, to see her spine doctor again. \par \par Antiphospholipid antibody-\par -she has had positive beta-2 glycoprotein antibodies on 2 occasions now\par -There is a family history of miscarriages\par -She is not contemplating any future pregnancies.\par -She has been started on aspirin QOD by hematology. Has reported much improvement in the previously frequent bruising, also has recent abd discomfort for which I have advised her to see GI. She previously had one low WBC, but not persistent. \par \par Abd bloating/discomfort- maybe anxiety related she now feels. \par -GI eval\par \par MGUS-\par Cleared by hematology- clinical surveilance. Advised to have follow ups periodically. \par \par ch fatigue-\par responds to metalxone prn\par \par Intermittent knee pain, usually following exercise\par -Advised activity modification on knees\par  \par Right wrist pain- notices it later in the day\par -may be related to weight lifting.  Advised rest from upper extremity weights exercises for about 3 weeks, with gradual low/small weight reintroduction subsequently. \par -She will use OTC aleve if needed, though currently not bad enough \par -monitor, and to call if worsens.  \par \par Followup 5-6 months. She is aware to call if her symptoms worsen. \par \par  \par

## 2023-01-11 LAB — CARDIOLIPIN AB SER IA-ACNC: NEGATIVE

## 2023-02-14 ENCOUNTER — NON-APPOINTMENT (OUTPATIENT)
Age: 43
End: 2023-02-14

## 2023-02-14 LAB
APPEARANCE: CLEAR
BILIRUBIN URINE: NEGATIVE
BLOOD URINE: NEGATIVE
COLOR: NORMAL
CREAT SPEC-SCNC: 113 MG/DL
CREAT/PROT UR: 0.1 RATIO
GLUCOSE QUALITATIVE U: NEGATIVE
KETONES URINE: NEGATIVE
LEUKOCYTE ESTERASE URINE: NEGATIVE
NITRITE URINE: NEGATIVE
PH URINE: 5.5
PROT UR-MCNC: 7 MG/DL
PROTEIN URINE: NORMAL
SPECIFIC GRAVITY URINE: 1.02
UROBILINOGEN URINE: NORMAL

## 2023-06-05 LAB
ALBUMIN SERPL ELPH-MCNC: 4.1 G/DL
ALP BLD-CCNC: 53 U/L
ALT SERPL-CCNC: 14 U/L
ANION GAP SERPL CALC-SCNC: 11 MMOL/L
APPEARANCE: CLEAR
AST SERPL-CCNC: 15 U/L
BILIRUB SERPL-MCNC: 0.4 MG/DL
BILIRUBIN URINE: NEGATIVE
BLOOD URINE: NEGATIVE
BUN SERPL-MCNC: 14 MG/DL
CALCIUM SERPL-MCNC: 9.3 MG/DL
CHLORIDE SERPL-SCNC: 106 MMOL/L
CO2 SERPL-SCNC: 22 MMOL/L
COLOR: YELLOW
CREAT SERPL-MCNC: 0.76 MG/DL
CREAT SPEC-SCNC: 40 MG/DL
CREAT/PROT UR: 0.1 RATIO
CRP SERPL-MCNC: <3 MG/L
EGFR: 100 ML/MIN/1.73M2
ERYTHROCYTE [SEDIMENTATION RATE] IN BLOOD BY WESTERGREN METHOD: < 2 MM/HR
GLUCOSE QUALITATIVE U: NEGATIVE MG/DL
GLUCOSE SERPL-MCNC: 85 MG/DL
KETONES URINE: NEGATIVE MG/DL
LEUKOCYTE ESTERASE URINE: NEGATIVE
NITRITE URINE: NEGATIVE
PH URINE: 6.5
POTASSIUM SERPL-SCNC: 4 MMOL/L
PROT SERPL-MCNC: 6.4 G/DL
PROT UR-MCNC: 5 MG/DL
PROTEIN URINE: NEGATIVE MG/DL
SODIUM SERPL-SCNC: 140 MMOL/L
SPECIFIC GRAVITY URINE: 1.01
UROBILINOGEN URINE: 0.2 MG/DL

## 2023-06-08 ENCOUNTER — APPOINTMENT (OUTPATIENT)
Dept: RHEUMATOLOGY | Facility: CLINIC | Age: 43
End: 2023-06-08
Payer: COMMERCIAL

## 2023-06-08 VITALS
WEIGHT: 132 LBS | TEMPERATURE: 98.2 F | BODY MASS INDEX: 21.21 KG/M2 | SYSTOLIC BLOOD PRESSURE: 116 MMHG | HEIGHT: 66 IN | HEART RATE: 66 BPM | DIASTOLIC BLOOD PRESSURE: 74 MMHG | OXYGEN SATURATION: 99 %

## 2023-06-08 PROCEDURE — 99214 OFFICE O/P EST MOD 30 MIN: CPT

## 2023-06-08 NOTE — HISTORY OF PRESENT ILLNESS
[FreeTextEntry1] : 44 y/o woman with hx Sjogren's syndrome. She is on Plaquenil. She has been doing well. She denies any rashes or fevers. Her last eye exam was about 1 year ago. \par She admits to dryness in her mouth and a dry cough intermittently\par She saw hematology and was told to use asa 81 mg QOD.\par \par For 2 weeks she felt tired, swollen, and drained.  \par She uses eye drops in AM and PM.\par \par PLQ (last eye screening september 2022)\par Dentist 1/2023, and has checkup next month.  \par \par Wondering if I can check her thyroid due to changes in period and fatigue feeling. \par Also wondering if she can have script for acupuncture as it significantly helped her neck pain last time. \par She reports pain in R outer knee. \par \par 3-4x/week\par Does cardio, weights.  She has an old injury and it flared.  She rested for a few weekk.s \par \par Has dentist appointments next week.\par \par She dropped ASA 81mg to QOD for the easy bruising. \par She saw her GI, had endoscopy, was told it was negative. \par \par \par She saw hematology and was noted to have antiphospholipid antibodies, Is not contemplating any pregnancies in the near future\par She states that she has flares intermittently and metaxalone does help. \par \par \par +Raynaud's, in the air conditioning at work. Is able to return to normal color within minutes upon rewarming. No painful digits, no ulcerations. \par \par She takes 1/2 metaxalone when she needs it. \par

## 2023-06-08 NOTE — ASSESSMENT
[FreeTextEntry1] : 43 year old  female with no significant past medical history has a previous diagnosis of Sjogren syndrome. The diagnosis was made several years ago by a rheumatologist based on her symptoms and abnormal labs.\par \par She has a significant family history of autoimmunity with several family members with lupus and rheumatoid arthritis. Her current review of systems is positive for alopecia, Raynaud's phenomenon, dry eyes, fatigue, muscle and joint pain.\par \par Based on her history I agree with the diagnosis of Sjogren syndrome. \par \par Sjogren syndrome-\par -she is on Plaquenil 200 mg b.i.d. Mon-Fri and once daily saturday and sunday given weight.\par - she is definitely better with PLQ. She did have her eye exam 09/2022. Plans to go again. \par -Given the presence of the Sjögren's antibodies when she gets pregnant the fetus will have to be monitored for heart block\par -I made her aware of this\par -Warming measures for Raynaud's. Inforemd her medication is possible if she feels conservative measures are insufficient or Raynauds worsens.  \par \par Neck pain- \par -Requesting repeat acupuncture as it was helpful last time.  \par -States she has hx of small herniated disc. PT helped her neck in the past. New referral given. If persistent afterwards, to see her spine doctor again. \par \par Right outer knee pain\par -likely ITB\par -Advised targeted exercises for this\par \par Antiphospholipid antibody-\par -she has had positive beta-2 glycoprotein antibodies on 2 occasions now\par -There is a family history of miscarriages\par -She is not contemplating any future pregnancies.\par -She has been started on aspirin QOD by hematology. Has reported much improvement in the previously frequent bruising, also has recent abd discomfort for which I have advised her to see GI. She previously had one low WBC, but not persistent. \par \par Abd bloating/discomfort- maybe anxiety related she now feels. \par -GI eval\par \par MGUS-\par Cleared by hematology- clinical surveilance. Advised to have follow ups periodically. \par \par ch fatigue-\par responds to metalxone prn\par \par  \par Right wrist pain- notices it later in the day, does not complain of this today\par -may be related to weight lifting. Advised rest from upper extremity weights exercises for about 3 weeks, with gradual low/small weight reintroduction subsequently. \par -She will use OTC aleve if needed, though currently not bad enough \par -monitor, and to call if worsens. \par \par Followup 5-6 months. She is aware to call if her symptoms worsen. \par \par  \par

## 2023-08-30 ENCOUNTER — LABORATORY RESULT (OUTPATIENT)
Age: 43
End: 2023-08-30

## 2023-08-30 LAB
ALBUMIN SERPL ELPH-MCNC: 4.4 G/DL
ALP BLD-CCNC: 55 U/L
ALT SERPL-CCNC: 15 U/L
ANION GAP SERPL CALC-SCNC: 9 MMOL/L
AST SERPL-CCNC: 16 U/L
B2 MICROGLOB SERPL-MCNC: 1.2 MG/L
BILIRUB SERPL-MCNC: 0.4 MG/DL
BUN SERPL-MCNC: 12 MG/DL
CALCIUM SERPL-MCNC: 9.5 MG/DL
CHLORIDE SERPL-SCNC: 107 MMOL/L
CO2 SERPL-SCNC: 22 MMOL/L
CREAT SERPL-MCNC: 0.84 MG/DL
DEPRECATED KAPPA LC FREE/LAMBDA SER: 1.17 RATIO
EGFR: 89 ML/MIN/1.73M2
GLUCOSE SERPL-MCNC: 93 MG/DL
IGA SER QL IEP: 223 MG/DL
IGG SER QL IEP: 1179 MG/DL
IGM SER QL IEP: 193 MG/DL
KAPPA LC CSF-MCNC: 1.72 MG/DL
KAPPA LC SERPL-MCNC: 2.02 MG/DL
POTASSIUM SERPL-SCNC: 4.7 MMOL/L
PROT SERPL-MCNC: 7 G/DL
SODIUM SERPL-SCNC: 139 MMOL/L

## 2023-08-31 ENCOUNTER — OUTPATIENT (OUTPATIENT)
Dept: OUTPATIENT SERVICES | Facility: HOSPITAL | Age: 43
LOS: 1 days | Discharge: HOME | End: 2023-08-31

## 2023-08-31 DIAGNOSIS — D47.2 MONOCLONAL GAMMOPATHY: ICD-10-CM

## 2023-09-01 LAB
ALBUMIN MFR SERPL ELPH: 61.3 %
ALBUMIN SERPL-MCNC: 4.3 G/DL
ALBUMIN/GLOB SERPL: 1.6 RATIO
ALPHA1 GLOB MFR SERPL ELPH: 2.8 %
ALPHA1 GLOB SERPL ELPH-MCNC: 0.2 G/DL
ALPHA2 GLOB MFR SERPL ELPH: 7.5 %
ALPHA2 GLOB SERPL ELPH-MCNC: 0.5 G/DL
B-GLOBULIN MFR SERPL ELPH: 10.3 %
B-GLOBULIN SERPL ELPH-MCNC: 0.7 G/DL
DEPRECATED KAPPA LC FREE/LAMBDA SER: 1.18 RATIO
GAMMA GLOB FLD ELPH-MCNC: 1.3 G/DL
GAMMA GLOB MFR SERPL ELPH: 18.1 %
IGA SER QL IEP: 232 MG/DL
IGG SER QL IEP: 1186 MG/DL
IGM SER QL IEP: 194 MG/DL
INTERPRETATION SERPL IEP-IMP: NORMAL
KAPPA LC CSF-MCNC: 1.69 MG/DL
KAPPA LC SERPL-MCNC: 2 MG/DL
M PROTEIN MFR SERPL ELPH: 3.9 %
M PROTEIN SPEC IFE-MCNC: NORMAL
MONOCLON BAND OBS SERPL: 0.3 G/DL
PROT SERPL-MCNC: 7 G/DL
PROT SERPL-MCNC: 7 G/DL

## 2023-09-05 ENCOUNTER — APPOINTMENT (OUTPATIENT)
Dept: HEMATOLOGY ONCOLOGY | Facility: CLINIC | Age: 43
End: 2023-09-05
Payer: COMMERCIAL

## 2023-09-05 DIAGNOSIS — D68.61 ANTIPHOSPHOLIPID SYNDROME: ICD-10-CM

## 2023-09-05 DIAGNOSIS — D47.2 MONOCLONAL GAMMOPATHY: ICD-10-CM

## 2023-09-05 DIAGNOSIS — M35.00 SICCA SYNDROME, UNSPECIFIED: ICD-10-CM

## 2023-09-05 PROCEDURE — 99213 OFFICE O/P EST LOW 20 MIN: CPT

## 2023-09-05 NOTE — ASSESSMENT
[FreeTextEntry1] : Patient is a 43 y.o. with a low risk MGUS - very low level of monoclonal IgG lambda incidentally detected on blood work in 2019. No clinical symptoms to suggest underlying plasma cell disorder. M spike remains low/stable at 0.3. Immunoglobulin levels, Kappa/lambda FLC ratio has been normal, and she has no evidence of anemia or CRI.   Continue with periodic monitoring. OK to come yearly given stability.   Patient also with persistent elevated B2 Glyco IgM.  No thromboembolic events. Continue low dose ASA 81 mg every other day.  Reviewed with MP - OK to space out longer to allow bruises to heal. Can take Q3 days for now.   Will need VTE prophylaxis in high-risk setting (surgeries, etc).  RV in one year/ sooner PRN. To have labs done prior to next visit.   Severiano Herrera (PCP) Katelin Buckner (Rheum) Chad Dunaway (GYN)

## 2023-09-05 NOTE — HISTORY OF PRESENT ILLNESS
[de-identified] : JOSE ALEJANDRO GURROLA is a 43 y.o. F who we are following for a low grade MGUS and antiphospholipid antibody positive.  Patient has been seeing rheumatology for a hx of Sjogren's syndrome - currently stable on Plaquenil.  November 2019 - Blood work done by her rheumatologist Dr Hood showed low level monoclonal IgG lambda. No anemia, renal disease, hypercalcemia or bone problems. She also has hx of elevated antiphospholipid antibodies - beta2 glycoprotein IgM detected on several occasions (~ titer 100)  with rest of APS panel negative and negative Lupus anticoagulant panel.  No hx of thromboembolism. Not on OCP.  No miscarriages. Takes Baby ASA.  Has remained under observation.  [de-identified] : Patient reports that she developed some bruises on her legs after moving last November.   She is concerned as she states the bruises have never gone away.  She has been on ASA QOD due to excessive bruising.  This has been agreeing with her much better. Taking Plaquenil 2 tabs M-F, 1 tab Sat, Sun.  Else feeling same as usual.  No other changes in her family, medical, or social history since her last visit of 9/7/22.

## 2023-09-05 NOTE — RESULTS/DATA
[FreeTextEntry1] : Labs done 8/30/23: WBC: 4.21, Hgb: 14.0,  Hct: 43.3,  MCV: 93.7,  Plts: 235                                 CMP: significant for creat: 0.84, Calcium: 9.5 M-spike 0.3, +IgG Lambda.  QIG's all WNL, K/K FLC Ratio: WNL

## 2023-09-05 NOTE — REVIEW OF SYSTEMS
[Patient Intake Form Reviewed] : Patient intake form was reviewed [Joint Pain] : joint pain [Joint Stiffness] : joint stiffness [Easy Bruising] : a tendency for easy bruising [Negative] : Allergic/Immunologic [Palpitations] : palpitations [FreeTextEntry4] : bleeding gums [FreeTextEntry9] : joint swelling

## 2023-09-05 NOTE — PHYSICAL EXAM
[Normal] : affect appropriate [de-identified] : anicteric [de-identified] : no c/c/e [de-identified] : no rashes, some faint bruises on lower legs.

## 2023-09-06 ENCOUNTER — APPOINTMENT (OUTPATIENT)
Dept: HEMATOLOGY ONCOLOGY | Facility: CLINIC | Age: 43
End: 2023-09-06

## 2023-10-04 ENCOUNTER — RX RENEWAL (OUTPATIENT)
Age: 43
End: 2023-10-04

## 2023-10-13 ENCOUNTER — LABORATORY RESULT (OUTPATIENT)
Age: 43
End: 2023-10-13

## 2023-10-13 LAB
APPEARANCE: CLEAR
BILIRUBIN URINE: NEGATIVE
BLOOD URINE: NEGATIVE
COLOR: YELLOW
GLUCOSE QUALITATIVE U: NEGATIVE MG/DL
KETONES URINE: NEGATIVE MG/DL
LEUKOCYTE ESTERASE URINE: NEGATIVE
NITRITE URINE: NEGATIVE
PH URINE: 6
PROTEIN URINE: NEGATIVE MG/DL
SPECIFIC GRAVITY URINE: 1.01
UROBILINOGEN URINE: 0.2 MG/DL

## 2023-10-16 LAB
ALBUMIN SERPL ELPH-MCNC: 4.5 G/DL
ALP BLD-CCNC: 57 U/L
ALT SERPL-CCNC: 11 U/L
ANION GAP SERPL CALC-SCNC: 18 MMOL/L
AST SERPL-CCNC: 15 U/L
BILIRUB SERPL-MCNC: 0.5 MG/DL
BUN SERPL-MCNC: 17 MG/DL
C3 SERPL-MCNC: 121 MG/DL
C4 SERPL-MCNC: 20 MG/DL
CALCIUM SERPL-MCNC: 9.6 MG/DL
CHLORIDE SERPL-SCNC: 104 MMOL/L
CO2 SERPL-SCNC: 18 MMOL/L
CREAT SERPL-MCNC: 0.8 MG/DL
CREAT SPEC-SCNC: 49 MG/DL
CREAT/PROT UR: 0.1 RATIO
CRP SERPL-MCNC: <3 MG/L
DSDNA AB SER-ACNC: <12 IU/ML
EGFR: 94 ML/MIN/1.73M2
ERYTHROCYTE [SEDIMENTATION RATE] IN BLOOD BY WESTERGREN METHOD: 8 MM/HR
GLUCOSE SERPL-MCNC: 89 MG/DL
POTASSIUM SERPL-SCNC: 4.9 MMOL/L
PROT SERPL-MCNC: 7 G/DL
PROT UR-MCNC: 4 MG/DL
SODIUM SERPL-SCNC: 140 MMOL/L
TSH SERPL-ACNC: 0.87 UIU/ML

## 2023-10-17 ENCOUNTER — APPOINTMENT (OUTPATIENT)
Dept: RHEUMATOLOGY | Facility: CLINIC | Age: 43
End: 2023-10-17
Payer: COMMERCIAL

## 2023-10-17 VITALS
BODY MASS INDEX: 21.21 KG/M2 | OXYGEN SATURATION: 99 % | HEART RATE: 77 BPM | WEIGHT: 132 LBS | HEIGHT: 66 IN | SYSTOLIC BLOOD PRESSURE: 109 MMHG | DIASTOLIC BLOOD PRESSURE: 77 MMHG | TEMPERATURE: 97.2 F

## 2023-10-17 PROCEDURE — 99214 OFFICE O/P EST MOD 30 MIN: CPT

## 2023-11-01 ENCOUNTER — NON-APPOINTMENT (OUTPATIENT)
Age: 43
End: 2023-11-01

## 2024-02-05 RX ORDER — METAXALONE 400 MG/1
400 TABLET ORAL DAILY
Qty: 60 | Refills: 2 | Status: ACTIVE | COMMUNITY
Start: 2020-09-02 | End: 1900-01-01

## 2024-04-02 LAB
ALBUMIN SERPL ELPH-MCNC: 4.3 G/DL
ALP BLD-CCNC: 56 U/L
ALT SERPL-CCNC: 16 U/L
ANION GAP SERPL CALC-SCNC: 11 MMOL/L
APPEARANCE: CLEAR
AST SERPL-CCNC: 17 U/L
BASOPHILS # BLD AUTO: 0.02 K/UL
BASOPHILS NFR BLD AUTO: 0.5 %
BILIRUB SERPL-MCNC: 0.5 MG/DL
BILIRUBIN URINE: NEGATIVE
BLOOD URINE: NEGATIVE
BUN SERPL-MCNC: 12 MG/DL
C3 SERPL-MCNC: 92 MG/DL
C4 SERPL-MCNC: 16 MG/DL
CALCIUM SERPL-MCNC: 9.1 MG/DL
CHLORIDE SERPL-SCNC: 105 MMOL/L
CO2 SERPL-SCNC: 22 MMOL/L
COLOR: YELLOW
CREAT SERPL-MCNC: 0.8 MG/DL
CREAT SPEC-SCNC: 49 MG/DL
CREAT/PROT UR: 0.1 RATIO
EGFR: 94 ML/MIN/1.73M2
EOSINOPHIL # BLD AUTO: 0.08 K/UL
EOSINOPHIL NFR BLD AUTO: 1.9 %
ERYTHROCYTE [SEDIMENTATION RATE] IN BLOOD BY WESTERGREN METHOD: 8 MM/HR
GLUCOSE QUALITATIVE U: NEGATIVE MG/DL
GLUCOSE SERPL-MCNC: 81 MG/DL
HCT VFR BLD CALC: 40.8 %
HGB BLD-MCNC: 13.4 G/DL
IMM GRANULOCYTES NFR BLD AUTO: 0.2 %
KETONES URINE: NEGATIVE MG/DL
LEUKOCYTE ESTERASE URINE: NEGATIVE
LYMPHOCYTES # BLD AUTO: 2.07 K/UL
LYMPHOCYTES NFR BLD AUTO: 49.1 %
MAN DIFF?: NORMAL
MCHC RBC-ENTMCNC: 30.5 PG
MCHC RBC-ENTMCNC: 32.8 GM/DL
MCV RBC AUTO: 92.9 FL
MONOCYTES # BLD AUTO: 0.3 K/UL
MONOCYTES NFR BLD AUTO: 7.1 %
NEUTROPHILS # BLD AUTO: 1.74 K/UL
NEUTROPHILS NFR BLD AUTO: 41.2 %
NITRITE URINE: NEGATIVE
PH URINE: 6.5
PLATELET # BLD AUTO: 248 K/UL
POTASSIUM SERPL-SCNC: 4.2 MMOL/L
PROT SERPL-MCNC: 7 G/DL
PROT UR-MCNC: 5 MG/DL
PROTEIN URINE: NEGATIVE MG/DL
RBC # BLD: 4.39 M/UL
RBC # FLD: 12.6 %
SODIUM SERPL-SCNC: 137 MMOL/L
SPECIFIC GRAVITY URINE: 1.01
UROBILINOGEN URINE: 0.2 MG/DL
WBC # FLD AUTO: 4.22 K/UL

## 2024-04-04 LAB — DSDNA AB SER-ACNC: 1 IU/ML

## 2024-04-11 ENCOUNTER — APPOINTMENT (OUTPATIENT)
Dept: RHEUMATOLOGY | Facility: CLINIC | Age: 44
End: 2024-04-11
Payer: COMMERCIAL

## 2024-04-11 VITALS
SYSTOLIC BLOOD PRESSURE: 108 MMHG | OXYGEN SATURATION: 98 % | WEIGHT: 130 LBS | HEIGHT: 66 IN | DIASTOLIC BLOOD PRESSURE: 68 MMHG | BODY MASS INDEX: 20.89 KG/M2 | TEMPERATURE: 97.3 F | HEART RATE: 78 BPM

## 2024-04-11 DIAGNOSIS — G89.29 CERVICALGIA: ICD-10-CM

## 2024-04-11 DIAGNOSIS — M35.00 SICCA SYNDROME, UNSPECIFIED: ICD-10-CM

## 2024-04-11 DIAGNOSIS — R10.9 UNSPECIFIED ABDOMINAL PAIN: ICD-10-CM

## 2024-04-11 DIAGNOSIS — D68.61 ANTIPHOSPHOLIPID SYNDROME: ICD-10-CM

## 2024-04-11 DIAGNOSIS — M54.2 CERVICALGIA: ICD-10-CM

## 2024-04-11 PROCEDURE — 99214 OFFICE O/P EST MOD 30 MIN: CPT

## 2024-04-11 NOTE — ASSESSMENT
[FreeTextEntry1] : Symptoms do improve with PPI x 2 weeks Hold off on cevimeline.  She will try xylimelets  43 year old  female with no significant past medical history has a previous diagnosis of Sjogren syndrome. The diagnosis was made several years ago by a rheumatologist based on her symptoms and abnormal labs.  She has a significant family history of autoimmunity with several family members with lupus and rheumatoid arthritis. Her current review of systems is positive for alopecia, Raynaud's phenomenon, dry eyes, fatigue, muscle and joint pain.   Based on her history I agree with the diagnosis of Sjogren syndrome.  Abd discomfort RUQ, intermittently, noted to improve when she takes a break from fatty foods -RUQ sono was negative -Advised GI f/u and inform them of PPI working when she took it for 2 weeks  Sjogren syndrome-  -she is on Plaquenil 200 mg b.i.d. Mon-Fri and once daily saturday and sunday given weight.  - she is definitely better with PLQ. She did have her eye exam 09/2023  -Given the presence of the Sjogren's antibodies when she gets pregnant the fetus will have to be monitored for heart block  -I made her aware of this  -Warming measures for Raynaud's. Inforemd her medication is possible if she feels conservative measures are insufficient or Raynauds worsens.  -We discussed cevimeline, and she is open to it....but states she can try xylimelts first.    Neck pain- -Continues acupuncture which is helpful for her. Does not need a Rx as it is out of pocket. -States she has hx of small herniated disc. PT helped her neck in the past. New referral given. If persistent afterwards, to see her spine doctor again.    Right outer knee pain- not an issue today. -likely ITB -Advised targeted exercises for this    Antiphospholipid antibody- -she has had positive beta-2 glycoprotein antibodies on 2 occasions now -There is a family history of miscarriages -She is not contemplating any future pregnancies. -She is on aspirin every 3 rd day by hematology. Has reported much improvement in the previously frequent bruising, also has recent abd discomfort for which I have advised her to see GI. She previously had one low WBC, but not persistent.    MGUS-  Cleared by hematology- clinical surveillance. Advised to have follow ups periodically.    ch fatigue-  responds to metalxone prn    Follow-up 5-6 months. She is aware to call if her symptoms worsen.

## 2024-04-11 NOTE — PHYSICAL EXAM
[TextEntry] : Constitutional: alert, well nourished and well developed. Eyes: the sclera and conjunctiva were normal. ENT:. the oropharynx was normal. Neck: the appearance of the neck was normal. Pulmonary: normal respiratory rhythm and effort and lungs were clear to auscultation bilaterally. Heart: normal S1 and S2 and no murmurs. Vascular: the pedal pulses are present . there was no peripheral edema. Abdomen: non-tender. Lymphatics: The anterior cervical and supraclavicular nodes were non-tender and normal size. Back: no spinal tenderness. Musculoskeletal: normal gait, no clubbing or cyanosis of the fingernails, no joint swelling seen and muscle strength and tone were normal . FROM neck, shoulders, elbows, wrists, hands, hips, knees, ankles and feet, including the small joints of the hands and feet without any evidence of inflammatory arthritis no tender points noted. Skin: no rash.

## 2024-04-11 NOTE — HISTORY OF PRESENT ILLNESS
[FreeTextEntry1] : 44 y/o woman with hx Sjogren's syndrome. She is on Plaquenil. She has been doing well. She denies any rashes or fevers. Her last eye exam was about 1 year ago.  [facility] exam 09/2023  She saw hematology and was noted to have antiphospholipid antibodies, Is not contemplating any pregnancies in the near future She dropped ASA 81mg to 3x/week for the easy bruising.  Denies cough, SOB Has dry mouth +Dry eye.  Uses Systane 2x/day   She feels RUQ pain every day.   She saw Dr. [facility] for another opinion.  She had endoscopy 2 days ago.  Was told it looked unremarkable.   Was told she had pancreatic tissue in duodenum or stomach.   She does admit to PPI course x 2 weeks completely resolving the issue, but states it comes back. Advised her to inform her GI of this information.     She reports right upper abd discomfort, particularly with fatty foods. She saw Dr. Preciado's NP. She had endoscopy and stool samples, and blood aubrey previously.

## 2024-05-02 ENCOUNTER — RX RENEWAL (OUTPATIENT)
Age: 44
End: 2024-05-02

## 2024-05-02 RX ORDER — HYDROXYCHLOROQUINE SULFATE 200 MG/1
200 TABLET, FILM COATED ORAL
Qty: 180 | Refills: 1 | Status: ACTIVE | COMMUNITY
Start: 2019-04-17 | End: 1900-01-01

## 2024-08-31 ENCOUNTER — OUTPATIENT (OUTPATIENT)
Dept: OUTPATIENT SERVICES | Facility: HOSPITAL | Age: 44
LOS: 1 days | Discharge: HOME | End: 2024-08-31

## 2024-08-31 DIAGNOSIS — D47.2 MONOCLONAL GAMMOPATHY: ICD-10-CM

## 2024-09-04 ENCOUNTER — NON-APPOINTMENT (OUTPATIENT)
Age: 44
End: 2024-09-04

## 2024-09-04 ENCOUNTER — APPOINTMENT (OUTPATIENT)
Dept: HEMATOLOGY ONCOLOGY | Facility: CLINIC | Age: 44
End: 2024-09-04

## 2024-09-04 ENCOUNTER — APPOINTMENT (OUTPATIENT)
Dept: HEMATOLOGY ONCOLOGY | Facility: CLINIC | Age: 44
End: 2024-09-04
Payer: COMMERCIAL

## 2024-09-04 VITALS
TEMPERATURE: 98.6 F | OXYGEN SATURATION: 97 % | SYSTOLIC BLOOD PRESSURE: 105 MMHG | BODY MASS INDEX: 20.89 KG/M2 | WEIGHT: 130 LBS | HEART RATE: 79 BPM | HEIGHT: 66 IN | DIASTOLIC BLOOD PRESSURE: 68 MMHG

## 2024-09-04 DIAGNOSIS — D47.2 MONOCLONAL GAMMOPATHY: ICD-10-CM

## 2024-09-04 PROCEDURE — 99213 OFFICE O/P EST LOW 20 MIN: CPT

## 2024-09-04 NOTE — REVIEW OF SYSTEMS
[Joint Pain] : joint pain [Joint Stiffness] : joint stiffness [Anxiety] : anxiety [Negative] : Allergic/Immunologic [Easy Bruising] : no tendency for easy bruising [FreeTextEntry4] : mucosal dryness

## 2024-09-04 NOTE — HISTORY OF PRESENT ILLNESS
[de-identified] : JOSE ALEJANDRO GURROLA is a 42 y.o.F who we are following for a low grade MGUS and antiphospholipid antibody positive.  Patient has been seeing rheumatology for a hx of  Sjogren's syndrome -  on Plaquenil.  November 2019 - Blood work done by her rheumatologist Dr Hood showed low level monoclonal IgG lambda. No anemia, renal disease, hypercalcemia or bone problems. She also has hx of elevated antiphospholipid antibodies - beta2 glycoprotein IgM detected on several occasions ( ~ titer 100 )  with rest of APS panel  negative and negative Lupus anticoagulant panel.  No hx of thromboembolism. Not on OCP.  No  miscarriages. Takes Baby ASA.  Has remained under observation.  [de-identified] : Takes baby ASA every other day - no bruising. lately more arthralgias. Continues Plaquenil Has appt with rheum tomorrow

## 2024-09-04 NOTE — ASSESSMENT
[FreeTextEntry1] : 43 y/o female with a low risk MGUS - very low level of monoclonal IgG lambda incidentally detected on blood work in 2019. No clinical symptoms to suggest underlying plasma cell disorder. M spike remains low/stable at 0.2. Immunoglobulin levels, Kappa/lambda FLC ratio has been normal, no backgrounfd immunoglobulins suppression and she has no evidence of anemia or CRI.   Continue with periodic monitoring- yearly OK given stability.   Patient also with persistent elevated B2 Glyco IgM.  No thromboembolic events. Continue low dose ASA 81 mg every other day. Will need VTE prophylaxis in high risk setting (surgeries, etc) .  RV in one year/ sooner PRN.  Blood work before appointment ( goes [facility])

## 2024-09-05 ENCOUNTER — APPOINTMENT (OUTPATIENT)
Dept: RHEUMATOLOGY | Facility: CLINIC | Age: 44
End: 2024-09-05
Payer: COMMERCIAL

## 2024-09-05 VITALS
WEIGHT: 132 LBS | BODY MASS INDEX: 21.21 KG/M2 | TEMPERATURE: 97.3 F | HEIGHT: 66 IN | HEART RATE: 70 BPM | OXYGEN SATURATION: 99 % | SYSTOLIC BLOOD PRESSURE: 112 MMHG | DIASTOLIC BLOOD PRESSURE: 80 MMHG

## 2024-09-05 DIAGNOSIS — M54.2 CERVICALGIA: ICD-10-CM

## 2024-09-05 DIAGNOSIS — G89.29 CERVICALGIA: ICD-10-CM

## 2024-09-05 DIAGNOSIS — M35.00 SICCA SYNDROME, UNSPECIFIED: ICD-10-CM

## 2024-09-05 DIAGNOSIS — M25.50 PAIN IN UNSPECIFIED JOINT: ICD-10-CM

## 2024-09-05 DIAGNOSIS — R76.0 RAISED ANTIBODY TITER: ICD-10-CM

## 2024-09-05 PROCEDURE — 99214 OFFICE O/P EST MOD 30 MIN: CPT

## 2024-09-05 RX ORDER — DICLOFENAC SODIUM 10 MG/G
1 GEL TOPICAL
Qty: 1 | Refills: 2 | Status: ACTIVE | COMMUNITY
Start: 2024-09-05 | End: 1900-01-01

## 2024-10-30 ENCOUNTER — RX RENEWAL (OUTPATIENT)
Age: 44
End: 2024-10-30

## 2025-03-03 ENCOUNTER — LABORATORY RESULT (OUTPATIENT)
Age: 45
End: 2025-03-03

## 2025-03-06 ENCOUNTER — APPOINTMENT (OUTPATIENT)
Dept: RHEUMATOLOGY | Facility: CLINIC | Age: 45
End: 2025-03-06
Payer: COMMERCIAL

## 2025-03-06 VITALS
BODY MASS INDEX: 21.53 KG/M2 | DIASTOLIC BLOOD PRESSURE: 82 MMHG | HEART RATE: 81 BPM | SYSTOLIC BLOOD PRESSURE: 102 MMHG | OXYGEN SATURATION: 98 % | HEIGHT: 66 IN | WEIGHT: 134 LBS | TEMPERATURE: 96.8 F

## 2025-03-06 DIAGNOSIS — M79.7 FIBROMYALGIA: ICD-10-CM

## 2025-03-06 DIAGNOSIS — M25.50 PAIN IN UNSPECIFIED JOINT: ICD-10-CM

## 2025-03-06 DIAGNOSIS — R76.0 RAISED ANTIBODY TITER: ICD-10-CM

## 2025-03-06 DIAGNOSIS — M35.00 SICCA SYNDROME, UNSPECIFIED: ICD-10-CM

## 2025-03-06 PROCEDURE — 99214 OFFICE O/P EST MOD 30 MIN: CPT

## 2025-03-06 RX ORDER — DULOXETINE HYDROCHLORIDE 20 MG/1
20 CAPSULE, DELAYED RELEASE PELLETS ORAL
Qty: 30 | Refills: 2 | Status: ACTIVE | COMMUNITY
Start: 2025-03-06 | End: 1900-01-01

## 2025-06-17 ENCOUNTER — APPOINTMENT (OUTPATIENT)
Dept: RHEUMATOLOGY | Facility: CLINIC | Age: 45
End: 2025-06-17
Payer: COMMERCIAL

## 2025-06-17 VITALS
HEIGHT: 66 IN | DIASTOLIC BLOOD PRESSURE: 66 MMHG | SYSTOLIC BLOOD PRESSURE: 108 MMHG | OXYGEN SATURATION: 98 % | HEART RATE: 78 BPM | WEIGHT: 134 LBS | TEMPERATURE: 97.6 F | BODY MASS INDEX: 21.53 KG/M2

## 2025-06-17 PROCEDURE — 99214 OFFICE O/P EST MOD 30 MIN: CPT

## 2025-09-09 ENCOUNTER — APPOINTMENT (OUTPATIENT)
Dept: HEMATOLOGY ONCOLOGY | Facility: CLINIC | Age: 45
End: 2025-09-09

## 2025-09-10 LAB
ALBUMIN SERPL ELPH-MCNC: 4.2 G/DL
ALP BLD-CCNC: 60 U/L
ALT SERPL-CCNC: 13 U/L
ANION GAP SERPL CALC-SCNC: 12 MMOL/L
AST SERPL-CCNC: 20 U/L
BILIRUB SERPL-MCNC: 0.3 MG/DL
BUN SERPL-MCNC: 12 MG/DL
CALCIUM SERPL-MCNC: 9.2 MG/DL
CHLORIDE SERPL-SCNC: 105 MMOL/L
CO2 SERPL-SCNC: 22 MMOL/L
CREAT SERPL-MCNC: 0.67 MG/DL
EGFRCR SERPLBLD CKD-EPI 2021: 110 ML/MIN/1.73M2
GLUCOSE SERPL-MCNC: 91 MG/DL
POTASSIUM SERPL-SCNC: 4.3 MMOL/L
PROT SERPL-MCNC: 6.9 G/DL
SODIUM SERPL-SCNC: 139 MMOL/L

## 2025-09-11 LAB
ALBUMIN MFR SERPL ELPH: 61.6 %
ALBUMIN SERPL-MCNC: 4.3 G/DL
ALBUMIN/GLOB SERPL: 1.7 RATIO
ALPHA1 GLOB MFR SERPL ELPH: 3.2 %
ALPHA1 GLOB SERPL ELPH-MCNC: 0.2 G/DL
ALPHA2 GLOB MFR SERPL ELPH: 7.9 %
ALPHA2 GLOB SERPL ELPH-MCNC: 0.5 G/DL
B-GLOBULIN MFR SERPL ELPH: 10.1 %
B-GLOBULIN SERPL ELPH-MCNC: 0.7 G/DL
DEPRECATED KAPPA LC FREE/LAMBDA SER: 1.09 RATIO
GAMMA GLOB FLD ELPH-MCNC: 1.2 G/DL
GAMMA GLOB MFR SERPL ELPH: 17.2 %
IGA SERPL-MCNC: 203 MG/DL
IGG SERPL-MCNC: 888 MG/DL
IGM SERPL-MCNC: 171 MG/DL
INTERPRETATION SERPL IEP-IMP: NORMAL
KAPPA LC CSF-MCNC: 1.48 MG/DL
KAPPA LC SERPL-MCNC: 1.61 MG/DL
M PROTEIN MFR SERPL ELPH: 4 %
M PROTEIN SPEC IFE-MCNC: NORMAL
MONOCLON BAND OBS SERPL: 0.3 G/DL
PROT SERPL-MCNC: 6.9 G/DL
PROT SERPL-MCNC: 6.9 G/DL

## 2025-09-16 ENCOUNTER — APPOINTMENT (OUTPATIENT)
Dept: HEMATOLOGY ONCOLOGY | Facility: CLINIC | Age: 45
End: 2025-09-16
Payer: COMMERCIAL

## 2025-09-16 DIAGNOSIS — R76.0 RAISED ANTIBODY TITER: ICD-10-CM

## 2025-09-16 DIAGNOSIS — D47.2 MONOCLONAL GAMMOPATHY: ICD-10-CM

## 2025-09-16 PROCEDURE — 99212 OFFICE O/P EST SF 10 MIN: CPT | Mod: 95
